# Patient Record
Sex: FEMALE | Race: WHITE | NOT HISPANIC OR LATINO | URBAN - METROPOLITAN AREA
[De-identification: names, ages, dates, MRNs, and addresses within clinical notes are randomized per-mention and may not be internally consistent; named-entity substitution may affect disease eponyms.]

---

## 2024-10-21 ENCOUNTER — APPOINTMENT (OUTPATIENT)
Dept: RADIOLOGY | Facility: HOSPITAL | Age: 49
End: 2024-10-21
Payer: COMMERCIAL

## 2024-10-21 ENCOUNTER — HOSPITAL ENCOUNTER (OUTPATIENT)
Facility: HOSPITAL | Age: 49
Setting detail: OBSERVATION
Discharge: HOME | End: 2024-10-23
Attending: STUDENT IN AN ORGANIZED HEALTH CARE EDUCATION/TRAINING PROGRAM | Admitting: SURGERY
Payer: COMMERCIAL

## 2024-10-21 DIAGNOSIS — K76.89 LIVER CYST: ICD-10-CM

## 2024-10-21 DIAGNOSIS — K35.30 ACUTE APPENDICITIS WITH LOCALIZED PERITONITIS, WITHOUT PERFORATION, ABSCESS, OR GANGRENE: Primary | ICD-10-CM

## 2024-10-21 PROBLEM — K35.80 ACUTE APPENDICITIS: Status: ACTIVE | Noted: 2024-10-21

## 2024-10-21 LAB
ALBUMIN SERPL BCP-MCNC: 3.9 G/DL (ref 3.4–5)
ALP SERPL-CCNC: 47 U/L (ref 33–110)
ALT SERPL W P-5'-P-CCNC: 15 U/L (ref 7–45)
ANION GAP SERPL CALC-SCNC: 12 MMOL/L (ref 10–20)
APPEARANCE UR: CLEAR
AST SERPL W P-5'-P-CCNC: 15 U/L (ref 9–39)
B-HCG SERPL-ACNC: <2 MIU/ML
BACTERIA #/AREA URNS AUTO: ABNORMAL /HPF
BASOPHILS # BLD AUTO: 0.02 X10*3/UL (ref 0–0.1)
BASOPHILS NFR BLD AUTO: 0.1 %
BILIRUB SERPL-MCNC: 0.8 MG/DL (ref 0–1.2)
BILIRUB UR STRIP.AUTO-MCNC: NEGATIVE MG/DL
BUN SERPL-MCNC: 9 MG/DL (ref 6–23)
CALCIUM SERPL-MCNC: 8.6 MG/DL (ref 8.6–10.3)
CHLORIDE SERPL-SCNC: 102 MMOL/L (ref 98–107)
CO2 SERPL-SCNC: 26 MMOL/L (ref 21–32)
COLOR UR: YELLOW
CREAT SERPL-MCNC: 0.7 MG/DL (ref 0.5–1.05)
EGFRCR SERPLBLD CKD-EPI 2021: >90 ML/MIN/1.73M*2
EOSINOPHIL # BLD AUTO: 0.05 X10*3/UL (ref 0–0.7)
EOSINOPHIL NFR BLD AUTO: 0.3 %
ERYTHROCYTE [DISTWIDTH] IN BLOOD BY AUTOMATED COUNT: 13.2 % (ref 11.5–14.5)
GLUCOSE SERPL-MCNC: 103 MG/DL (ref 74–99)
GLUCOSE UR STRIP.AUTO-MCNC: NORMAL MG/DL
HCG UR QL IA.RAPID: NEGATIVE
HCT VFR BLD AUTO: 39.9 % (ref 36–46)
HGB BLD-MCNC: 13.1 G/DL (ref 12–16)
IMM GRANULOCYTES # BLD AUTO: 0.07 X10*3/UL (ref 0–0.7)
IMM GRANULOCYTES NFR BLD AUTO: 0.4 % (ref 0–0.9)
KETONES UR STRIP.AUTO-MCNC: ABNORMAL MG/DL
LACTATE SERPL-SCNC: 0.7 MMOL/L (ref 0.4–2)
LEUKOCYTE ESTERASE UR QL STRIP.AUTO: NEGATIVE
LIPASE SERPL-CCNC: 8 U/L (ref 9–82)
LYMPHOCYTES # BLD AUTO: 2.2 X10*3/UL (ref 1.2–4.8)
LYMPHOCYTES NFR BLD AUTO: 12.6 %
MCH RBC QN AUTO: 31.2 PG (ref 26–34)
MCHC RBC AUTO-ENTMCNC: 32.8 G/DL (ref 32–36)
MCV RBC AUTO: 95 FL (ref 80–100)
MONOCYTES # BLD AUTO: 1.08 X10*3/UL (ref 0.1–1)
MONOCYTES NFR BLD AUTO: 6.2 %
MUCOUS THREADS #/AREA URNS AUTO: ABNORMAL /LPF
NEUTROPHILS # BLD AUTO: 14.1 X10*3/UL (ref 1.2–7.7)
NEUTROPHILS NFR BLD AUTO: 80.4 %
NITRITE UR QL STRIP.AUTO: NEGATIVE
NRBC BLD-RTO: 0 /100 WBCS (ref 0–0)
PH UR STRIP.AUTO: 6 [PH]
PLATELET # BLD AUTO: 348 X10*3/UL (ref 150–450)
POTASSIUM SERPL-SCNC: 3.8 MMOL/L (ref 3.5–5.3)
PROT SERPL-MCNC: 6.9 G/DL (ref 6.4–8.2)
PROT UR STRIP.AUTO-MCNC: NEGATIVE MG/DL
RBC # BLD AUTO: 4.2 X10*6/UL (ref 4–5.2)
RBC # UR STRIP.AUTO: ABNORMAL /UL
RBC #/AREA URNS AUTO: ABNORMAL /HPF
SODIUM SERPL-SCNC: 136 MMOL/L (ref 136–145)
SP GR UR STRIP.AUTO: 1.01
SQUAMOUS #/AREA URNS AUTO: ABNORMAL /HPF
UROBILINOGEN UR STRIP.AUTO-MCNC: NORMAL MG/DL
WBC # BLD AUTO: 17.5 X10*3/UL (ref 4.4–11.3)
WBC #/AREA URNS AUTO: ABNORMAL /HPF

## 2024-10-21 PROCEDURE — G0378 HOSPITAL OBSERVATION PER HR: HCPCS

## 2024-10-21 PROCEDURE — 74177 CT ABD & PELVIS W/CONTRAST: CPT | Performed by: RADIOLOGY

## 2024-10-21 PROCEDURE — 81003 URINALYSIS AUTO W/O SCOPE: CPT | Performed by: NURSE PRACTITIONER

## 2024-10-21 PROCEDURE — 99285 EMERGENCY DEPT VISIT HI MDM: CPT | Mod: 25

## 2024-10-21 PROCEDURE — 99221 1ST HOSP IP/OBS SF/LOW 40: CPT

## 2024-10-21 PROCEDURE — 74177 CT ABD & PELVIS W/CONTRAST: CPT

## 2024-10-21 PROCEDURE — 84702 CHORIONIC GONADOTROPIN TEST: CPT | Performed by: STUDENT IN AN ORGANIZED HEALTH CARE EDUCATION/TRAINING PROGRAM

## 2024-10-21 PROCEDURE — 2550000001 HC RX 255 CONTRASTS: Performed by: NURSE PRACTITIONER

## 2024-10-21 PROCEDURE — 85025 COMPLETE CBC W/AUTO DIFF WBC: CPT | Performed by: NURSE PRACTITIONER

## 2024-10-21 PROCEDURE — 83690 ASSAY OF LIPASE: CPT | Performed by: NURSE PRACTITIONER

## 2024-10-21 PROCEDURE — 2500000004 HC RX 250 GENERAL PHARMACY W/ HCPCS (ALT 636 FOR OP/ED): Performed by: STUDENT IN AN ORGANIZED HEALTH CARE EDUCATION/TRAINING PROGRAM

## 2024-10-21 PROCEDURE — 80053 COMPREHEN METABOLIC PANEL: CPT | Performed by: NURSE PRACTITIONER

## 2024-10-21 PROCEDURE — 36415 COLL VENOUS BLD VENIPUNCTURE: CPT | Performed by: NURSE PRACTITIONER

## 2024-10-21 PROCEDURE — 83605 ASSAY OF LACTIC ACID: CPT | Performed by: NURSE PRACTITIONER

## 2024-10-21 PROCEDURE — 2500000001 HC RX 250 WO HCPCS SELF ADMINISTERED DRUGS (ALT 637 FOR MEDICARE OP)

## 2024-10-21 PROCEDURE — 96365 THER/PROPH/DIAG IV INF INIT: CPT | Mod: 59

## 2024-10-21 PROCEDURE — 81025 URINE PREGNANCY TEST: CPT | Performed by: NURSE PRACTITIONER

## 2024-10-21 RX ORDER — NALOXONE HYDROCHLORIDE 0.4 MG/ML
0.2 INJECTION, SOLUTION INTRAMUSCULAR; INTRAVENOUS; SUBCUTANEOUS EVERY 5 MIN PRN
Status: DISCONTINUED | OUTPATIENT
Start: 2024-10-21 | End: 2024-10-21

## 2024-10-21 RX ORDER — MORPHINE SULFATE 2 MG/ML
2 INJECTION, SOLUTION INTRAMUSCULAR; INTRAVENOUS EVERY 4 HOURS PRN
Status: DISCONTINUED | OUTPATIENT
Start: 2024-10-21 | End: 2024-10-21

## 2024-10-21 RX ORDER — ACETAMINOPHEN 325 MG/1
650 TABLET ORAL EVERY 4 HOURS PRN
Status: DISCONTINUED | OUTPATIENT
Start: 2024-10-21 | End: 2024-10-23 | Stop reason: HOSPADM

## 2024-10-21 RX ORDER — MORPHINE SULFATE 4 MG/ML
4 INJECTION, SOLUTION INTRAMUSCULAR; INTRAVENOUS ONCE
Status: DISCONTINUED | OUTPATIENT
Start: 2024-10-21 | End: 2024-10-23 | Stop reason: HOSPADM

## 2024-10-21 RX ORDER — ONDANSETRON 4 MG/1
4 TABLET, ORALLY DISINTEGRATING ORAL EVERY 8 HOURS PRN
Status: DISCONTINUED | OUTPATIENT
Start: 2024-10-21 | End: 2024-10-23 | Stop reason: HOSPADM

## 2024-10-21 RX ORDER — ONDANSETRON HYDROCHLORIDE 2 MG/ML
4 INJECTION, SOLUTION INTRAVENOUS EVERY 8 HOURS PRN
Status: DISCONTINUED | OUTPATIENT
Start: 2024-10-21 | End: 2024-10-23 | Stop reason: HOSPADM

## 2024-10-21 RX ORDER — MORPHINE SULFATE 4 MG/ML
4 INJECTION, SOLUTION INTRAMUSCULAR; INTRAVENOUS EVERY 4 HOURS PRN
Status: DISCONTINUED | OUTPATIENT
Start: 2024-10-21 | End: 2024-10-22

## 2024-10-21 SDOH — SOCIAL STABILITY: SOCIAL INSECURITY: WERE YOU ABLE TO COMPLETE ALL THE BEHAVIORAL HEALTH SCREENINGS?: YES

## 2024-10-21 SDOH — SOCIAL STABILITY: SOCIAL INSECURITY: WITHIN THE LAST YEAR, HAVE YOU BEEN HUMILIATED OR EMOTIONALLY ABUSED IN OTHER WAYS BY YOUR PARTNER OR EX-PARTNER?: NO

## 2024-10-21 SDOH — SOCIAL STABILITY: SOCIAL INSECURITY: DO YOU FEEL UNSAFE GOING BACK TO THE PLACE WHERE YOU ARE LIVING?: NO

## 2024-10-21 SDOH — SOCIAL STABILITY: SOCIAL INSECURITY: ARE THERE ANY APPARENT SIGNS OF INJURIES/BEHAVIORS THAT COULD BE RELATED TO ABUSE/NEGLECT?: NO

## 2024-10-21 SDOH — ECONOMIC STABILITY: HOUSING INSECURITY: AT ANY TIME IN THE PAST 12 MONTHS, WERE YOU HOMELESS OR LIVING IN A SHELTER (INCLUDING NOW)?: PATIENT DECLINED

## 2024-10-21 SDOH — SOCIAL STABILITY: SOCIAL INSECURITY: WITHIN THE LAST YEAR, HAVE YOU BEEN AFRAID OF YOUR PARTNER OR EX-PARTNER?: NO

## 2024-10-21 SDOH — SOCIAL STABILITY: SOCIAL INSECURITY
WITHIN THE LAST YEAR, HAVE YOU BEEN KICKED, HIT, SLAPPED, OR OTHERWISE PHYSICALLY HURT BY YOUR PARTNER OR EX-PARTNER?: NO

## 2024-10-21 SDOH — SOCIAL STABILITY: SOCIAL INSECURITY: HAS ANYONE EVER THREATENED TO HURT YOUR FAMILY OR YOUR PETS?: NO

## 2024-10-21 SDOH — ECONOMIC STABILITY: FOOD INSECURITY: WITHIN THE PAST 12 MONTHS, THE FOOD YOU BOUGHT JUST DIDN'T LAST AND YOU DIDN'T HAVE MONEY TO GET MORE.: PATIENT DECLINED

## 2024-10-21 SDOH — SOCIAL STABILITY: SOCIAL INSECURITY
WITHIN THE LAST YEAR, HAVE YOU BEEN RAPED OR FORCED TO HAVE ANY KIND OF SEXUAL ACTIVITY BY YOUR PARTNER OR EX-PARTNER?: NO

## 2024-10-21 SDOH — SOCIAL STABILITY: SOCIAL INSECURITY: DOES ANYONE TRY TO KEEP YOU FROM HAVING/CONTACTING OTHER FRIENDS OR DOING THINGS OUTSIDE YOUR HOME?: NO

## 2024-10-21 SDOH — ECONOMIC STABILITY: INCOME INSECURITY
IN THE PAST 12 MONTHS HAS THE ELECTRIC, GAS, OIL, OR WATER COMPANY THREATENED TO SHUT OFF SERVICES IN YOUR HOME?: PATIENT DECLINED

## 2024-10-21 SDOH — SOCIAL STABILITY: SOCIAL INSECURITY: DO YOU FEEL ANYONE HAS EXPLOITED OR TAKEN ADVANTAGE OF YOU FINANCIALLY OR OF YOUR PERSONAL PROPERTY?: NO

## 2024-10-21 SDOH — SOCIAL STABILITY: SOCIAL INSECURITY: HAVE YOU HAD ANY THOUGHTS OF HARMING ANYONE ELSE?: NO

## 2024-10-21 SDOH — ECONOMIC STABILITY: HOUSING INSECURITY: IN THE LAST 12 MONTHS, WAS THERE A TIME WHEN YOU WERE NOT ABLE TO PAY THE MORTGAGE OR RENT ON TIME?: PATIENT DECLINED

## 2024-10-21 SDOH — ECONOMIC STABILITY: FOOD INSECURITY: HOW HARD IS IT FOR YOU TO PAY FOR THE VERY BASICS LIKE FOOD, HOUSING, MEDICAL CARE, AND HEATING?: PATIENT DECLINED

## 2024-10-21 SDOH — SOCIAL STABILITY: SOCIAL INSECURITY: HAVE YOU HAD THOUGHTS OF HARMING ANYONE ELSE?: NO

## 2024-10-21 SDOH — ECONOMIC STABILITY: HOUSING INSECURITY: IN THE PAST 12 MONTHS, HOW MANY TIMES HAVE YOU MOVED WHERE YOU WERE LIVING?: 1

## 2024-10-21 SDOH — SOCIAL STABILITY: SOCIAL INSECURITY: ABUSE: ADULT

## 2024-10-21 SDOH — SOCIAL STABILITY: SOCIAL INSECURITY: ARE YOU OR HAVE YOU BEEN THREATENED OR ABUSED PHYSICALLY, EMOTIONALLY, OR SEXUALLY BY ANYONE?: NO

## 2024-10-21 SDOH — ECONOMIC STABILITY: TRANSPORTATION INSECURITY
IN THE PAST 12 MONTHS, HAS LACK OF TRANSPORTATION KEPT YOU FROM MEDICAL APPOINTMENTS OR FROM GETTING MEDICATIONS?: PATIENT DECLINED

## 2024-10-21 SDOH — ECONOMIC STABILITY: FOOD INSECURITY
WITHIN THE PAST 12 MONTHS, YOU WORRIED THAT YOUR FOOD WOULD RUN OUT BEFORE YOU GOT THE MONEY TO BUY MORE.: PATIENT DECLINED

## 2024-10-21 ASSESSMENT — ACTIVITIES OF DAILY LIVING (ADL)
GROOMING: INDEPENDENT
TOILETING: INDEPENDENT
LACK_OF_TRANSPORTATION: PATIENT DECLINED
WALKS IN HOME: INDEPENDENT
JUDGMENT_ADEQUATE_SAFELY_COMPLETE_DAILY_ACTIVITIES: YES
PATIENT'S MEMORY ADEQUATE TO SAFELY COMPLETE DAILY ACTIVITIES?: YES
HEARING - LEFT EAR: FUNCTIONAL
BATHING: INDEPENDENT
LACK_OF_TRANSPORTATION: PATIENT DECLINED
FEEDING YOURSELF: INDEPENDENT
HEARING - RIGHT EAR: FUNCTIONAL
ADEQUATE_TO_COMPLETE_ADL: YES
DRESSING YOURSELF: INDEPENDENT

## 2024-10-21 ASSESSMENT — PAIN DESCRIPTION - DESCRIPTORS
DESCRIPTORS: ACHING
DESCRIPTORS: ACHING;PRESSURE

## 2024-10-21 ASSESSMENT — PAIN DESCRIPTION - PAIN TYPE: TYPE: ACUTE PAIN

## 2024-10-21 ASSESSMENT — COLUMBIA-SUICIDE SEVERITY RATING SCALE - C-SSRS
2. HAVE YOU ACTUALLY HAD ANY THOUGHTS OF KILLING YOURSELF?: NO
6. HAVE YOU EVER DONE ANYTHING, STARTED TO DO ANYTHING, OR PREPARED TO DO ANYTHING TO END YOUR LIFE?: NO
1. IN THE PAST MONTH, HAVE YOU WISHED YOU WERE DEAD OR WISHED YOU COULD GO TO SLEEP AND NOT WAKE UP?: NO

## 2024-10-21 ASSESSMENT — PAIN SCALES - GENERAL
PAINLEVEL_OUTOF10: 6
PAINLEVEL_OUTOF10: 6

## 2024-10-21 ASSESSMENT — COGNITIVE AND FUNCTIONAL STATUS - GENERAL
PATIENT BASELINE BEDBOUND: NO
MOBILITY SCORE: 24
DAILY ACTIVITIY SCORE: 24

## 2024-10-21 ASSESSMENT — ENCOUNTER SYMPTOMS
VOMITING: 0
ABDOMINAL PAIN: 1
CONSTIPATION: 0
NAUSEA: 1
CHILLS: 1
DIARRHEA: 1

## 2024-10-21 ASSESSMENT — PAIN - FUNCTIONAL ASSESSMENT
PAIN_FUNCTIONAL_ASSESSMENT: 0-10
PAIN_FUNCTIONAL_ASSESSMENT: 0-10

## 2024-10-21 ASSESSMENT — PATIENT HEALTH QUESTIONNAIRE - PHQ9
1. LITTLE INTEREST OR PLEASURE IN DOING THINGS: NOT AT ALL
SUM OF ALL RESPONSES TO PHQ9 QUESTIONS 1 & 2: 0
2. FEELING DOWN, DEPRESSED OR HOPELESS: NOT AT ALL

## 2024-10-21 ASSESSMENT — LIFESTYLE VARIABLES
AUDIT-C TOTAL SCORE: -1
HOW OFTEN DO YOU HAVE 6 OR MORE DRINKS ON ONE OCCASION: PATIENT DECLINED
AUDIT-C TOTAL SCORE: -1
HOW OFTEN DO YOU HAVE A DRINK CONTAINING ALCOHOL: PATIENT DECLINED
SKIP TO QUESTIONS 9-10: 0
HOW MANY STANDARD DRINKS CONTAINING ALCOHOL DO YOU HAVE ON A TYPICAL DAY: PATIENT DECLINED

## 2024-10-21 ASSESSMENT — PAIN DESCRIPTION - LOCATION
LOCATION: ABDOMEN
LOCATION: ABDOMEN

## 2024-10-21 ASSESSMENT — PAIN DESCRIPTION - ORIENTATION: ORIENTATION: RIGHT

## 2024-10-21 NOTE — ED TRIAGE NOTES
TRIAGE NOTE   I saw the patient as the Clinician in Triage and performed a brief history and physical exam, established acuity, and ordered appropriate tests to develop basic plan of care. Patient will be seen by an CLAUDE, resident and/or physician who will independently evaluate the patient. Please see subsequent provider notes for further details and disposition.     Brief HPI: In brief, Fifi Larsen is a 49 y.o. female that presents for right lower quadrant abdominal pain with nausea x 2 days..     Focused Physical exam:   Alert and oriented  Right lower quadrant pain on exam  Positive McBurney sign  RRR    Plan/MDM:   CBC, CMP, lipase, lactic, urinalysis, urine pregnancy, CT abdomen pelvis with contrast    Please see subsequent provider note for further details and disposition

## 2024-10-21 NOTE — ED PROVIDER NOTES
HPI   Chief Complaint   Patient presents with    Abdominal Pain       49-year-old female presents with right lower quadrant abdominal pain for the past day.  She reports the pain was initially generalized and has since moved into the right lower quadrant.  She has no significant medical history and does not take any medications regularly.  She denies any known history of ovarian cysts, recurrent UTI, nephrolithiasis, history of prior abdominal surgeries.  She denies chest pain, shortness of breath, fevers, chills, vomiting, diarrhea, hematochezia, melena, dysuria, flank pain.              Patient History   Past Medical History:   Diagnosis Date    Acute appendicitis     RLQ abdominal pain      Past Surgical History:   Procedure Laterality Date    BREAST LUMPECTOMY Left      No family history on file.  Social History     Tobacco Use    Smoking status: Never    Smokeless tobacco: Never   Substance Use Topics    Alcohol use: Yes     Comment: occasional    Drug use: Never       Physical Exam   ED Triage Vitals [10/21/24 1603]   Temperature Heart Rate Respirations BP   37.6 °C (99.7 °F) 74 20 110/79      Pulse Ox Temp Source Heart Rate Source Patient Position   99 % Temporal Monitor --      BP Location FiO2 (%)     -- --       Physical Exam  Vitals reviewed.   Cardiovascular:      Rate and Rhythm: Normal rate and regular rhythm.   Pulmonary:      Effort: Pulmonary effort is normal.      Breath sounds: Normal breath sounds.   Abdominal:      Palpations: Abdomen is soft.      Tenderness: There is abdominal tenderness in the right lower quadrant and suprapubic area. There is guarding. There is no right CVA tenderness, left CVA tenderness or rebound.   Neurological:      General: No focal deficit present.      Mental Status: She is alert and oriented to person, place, and time.           ED Course & MDM   Diagnoses as of 10/24/24 0121   Acute appendicitis with localized peritonitis, without perforation, abscess, or gangrene    Liver cyst                 No data recorded     Diller Coma Scale Score: 15 (10/23/24 0908 : David Alberts RN)                           Medical Decision Making  49-year-old female without significant medical history with no prior surgical history presenting with right lower quadrant pain since last night.  Reports worsening pain since that time.  Patient nontoxic on exam, resting comfortably.  Vital signs stable.  Patient does have tenderness palpation in the right lower quadrant with voluntary guarding, no rebound or rigidity.  No CVA tenderness.  Patient remains n.p.o. from arrival to the emergency department.  Clinical considerations include appendicitis versus perforated appendix versus ovarian torsion versus cystitis versus ovarian cyst CT abdomen pelvis showing acute appendicitis without a perforation.  General surgery consulted.  Signed out to oncoming attending provider for further disposition and admission.        Procedure  Procedures     Tracey Castaneda MD  10/21/24 214       Tracey Castaneda MD  10/24/24 0125

## 2024-10-22 ENCOUNTER — ANESTHESIA (OUTPATIENT)
Dept: OPERATING ROOM | Facility: HOSPITAL | Age: 49
End: 2024-10-22
Payer: COMMERCIAL

## 2024-10-22 ENCOUNTER — ANESTHESIA EVENT (OUTPATIENT)
Dept: OPERATING ROOM | Facility: HOSPITAL | Age: 49
End: 2024-10-22
Payer: COMMERCIAL

## 2024-10-22 LAB
ANION GAP SERPL CALC-SCNC: 14 MMOL/L (ref 10–20)
BUN SERPL-MCNC: 11 MG/DL (ref 6–23)
CALCIUM SERPL-MCNC: 8.3 MG/DL (ref 8.6–10.3)
CHLORIDE SERPL-SCNC: 102 MMOL/L (ref 98–107)
CO2 SERPL-SCNC: 25 MMOL/L (ref 21–32)
CREAT SERPL-MCNC: 0.84 MG/DL (ref 0.5–1.05)
EGFRCR SERPLBLD CKD-EPI 2021: 85 ML/MIN/1.73M*2
ERYTHROCYTE [DISTWIDTH] IN BLOOD BY AUTOMATED COUNT: 13.2 % (ref 11.5–14.5)
GLUCOSE SERPL-MCNC: 91 MG/DL (ref 74–99)
HCT VFR BLD AUTO: 37.1 % (ref 36–46)
HGB BLD-MCNC: 12.3 G/DL (ref 12–16)
MCH RBC QN AUTO: 31.4 PG (ref 26–34)
MCHC RBC AUTO-ENTMCNC: 33.2 G/DL (ref 32–36)
MCV RBC AUTO: 95 FL (ref 80–100)
NRBC BLD-RTO: 0 /100 WBCS (ref 0–0)
PLATELET # BLD AUTO: 291 X10*3/UL (ref 150–450)
POTASSIUM SERPL-SCNC: 3.6 MMOL/L (ref 3.5–5.3)
RBC # BLD AUTO: 3.92 X10*6/UL (ref 4–5.2)
SODIUM SERPL-SCNC: 137 MMOL/L (ref 136–145)
WBC # BLD AUTO: 14.6 X10*3/UL (ref 4.4–11.3)

## 2024-10-22 PROCEDURE — 36415 COLL VENOUS BLD VENIPUNCTURE: CPT

## 2024-10-22 PROCEDURE — 9420000001 HC RT PATIENT EDUCATION 5 MIN

## 2024-10-22 PROCEDURE — 96366 THER/PROPH/DIAG IV INF ADDON: CPT | Mod: 59

## 2024-10-22 PROCEDURE — 2500000001 HC RX 250 WO HCPCS SELF ADMINISTERED DRUGS (ALT 637 FOR MEDICARE OP): Performed by: SURGERY

## 2024-10-22 PROCEDURE — 96372 THER/PROPH/DIAG INJ SC/IM: CPT | Mod: 59 | Performed by: SURGERY

## 2024-10-22 PROCEDURE — 3700000001 HC GENERAL ANESTHESIA TIME - INITIAL BASE CHARGE: Performed by: SURGERY

## 2024-10-22 PROCEDURE — G0378 HOSPITAL OBSERVATION PER HR: HCPCS

## 2024-10-22 PROCEDURE — 7100000001 HC RECOVERY ROOM TIME - INITIAL BASE CHARGE: Performed by: SURGERY

## 2024-10-22 PROCEDURE — 2720000007 HC OR 272 NO HCPCS: Performed by: SURGERY

## 2024-10-22 PROCEDURE — 3700000002 HC GENERAL ANESTHESIA TIME - EACH INCREMENTAL 1 MINUTE: Performed by: SURGERY

## 2024-10-22 PROCEDURE — 85027 COMPLETE CBC AUTOMATED: CPT

## 2024-10-22 PROCEDURE — A44970 PR LAP,APPENDECTOMY: Performed by: ANESTHESIOLOGY

## 2024-10-22 PROCEDURE — 2500000005 HC RX 250 GENERAL PHARMACY W/O HCPCS: Performed by: SURGERY

## 2024-10-22 PROCEDURE — 96375 TX/PRO/DX INJ NEW DRUG ADDON: CPT | Mod: 59

## 2024-10-22 PROCEDURE — 44970 LAPAROSCOPY APPENDECTOMY: CPT | Performed by: SURGERY

## 2024-10-22 PROCEDURE — 2500000004 HC RX 250 GENERAL PHARMACY W/ HCPCS (ALT 636 FOR OP/ED): Performed by: SURGERY

## 2024-10-22 PROCEDURE — A44970 PR LAP,APPENDECTOMY: Performed by: NURSE ANESTHETIST, CERTIFIED REGISTERED

## 2024-10-22 PROCEDURE — 3600000004 HC OR TIME - INITIAL BASE CHARGE - PROCEDURE LEVEL FOUR: Performed by: SURGERY

## 2024-10-22 PROCEDURE — 0752T DGTZ GLS MCRSCP SLD LVL III: CPT | Mod: TC,AHULAB

## 2024-10-22 PROCEDURE — 3600000009 HC OR TIME - EACH INCREMENTAL 1 MINUTE - PROCEDURE LEVEL FOUR: Performed by: SURGERY

## 2024-10-22 PROCEDURE — 99140 ANES COMP EMERGENCY COND: CPT | Performed by: ANESTHESIOLOGY

## 2024-10-22 PROCEDURE — 2500000004 HC RX 250 GENERAL PHARMACY W/ HCPCS (ALT 636 FOR OP/ED): Performed by: NURSE ANESTHETIST, CERTIFIED REGISTERED

## 2024-10-22 PROCEDURE — 80048 BASIC METABOLIC PNL TOTAL CA: CPT

## 2024-10-22 PROCEDURE — 7100000002 HC RECOVERY ROOM TIME - EACH INCREMENTAL 1 MINUTE: Performed by: SURGERY

## 2024-10-22 PROCEDURE — 2500000004 HC RX 250 GENERAL PHARMACY W/ HCPCS (ALT 636 FOR OP/ED)

## 2024-10-22 RX ORDER — OXYCODONE HYDROCHLORIDE 5 MG/1
5 TABLET ORAL EVERY 4 HOURS PRN
Status: DISCONTINUED | OUTPATIENT
Start: 2024-10-22 | End: 2024-10-22 | Stop reason: HOSPADM

## 2024-10-22 RX ORDER — FENTANYL CITRATE 50 UG/ML
INJECTION, SOLUTION INTRAMUSCULAR; INTRAVENOUS AS NEEDED
Status: DISCONTINUED | OUTPATIENT
Start: 2024-10-22 | End: 2024-10-22

## 2024-10-22 RX ORDER — LIDOCAINE HYDROCHLORIDE 10 MG/ML
0.1 INJECTION, SOLUTION EPIDURAL; INFILTRATION; INTRACAUDAL; PERINEURAL ONCE
Status: DISCONTINUED | OUTPATIENT
Start: 2024-10-22 | End: 2024-10-22 | Stop reason: HOSPADM

## 2024-10-22 RX ORDER — BUPIVACAINE HYDROCHLORIDE 5 MG/ML
INJECTION, SOLUTION PERINEURAL AS NEEDED
Status: DISCONTINUED | OUTPATIENT
Start: 2024-10-22 | End: 2024-10-22 | Stop reason: HOSPADM

## 2024-10-22 RX ORDER — ROCURONIUM BROMIDE 10 MG/ML
INJECTION, SOLUTION INTRAVENOUS AS NEEDED
Status: DISCONTINUED | OUTPATIENT
Start: 2024-10-22 | End: 2024-10-22

## 2024-10-22 RX ORDER — SODIUM CHLORIDE, SODIUM LACTATE, POTASSIUM CHLORIDE, CALCIUM CHLORIDE 600; 310; 30; 20 MG/100ML; MG/100ML; MG/100ML; MG/100ML
100 INJECTION, SOLUTION INTRAVENOUS CONTINUOUS
Status: DISCONTINUED | OUTPATIENT
Start: 2024-10-22 | End: 2024-10-22 | Stop reason: HOSPADM

## 2024-10-22 RX ORDER — HEPARIN SODIUM 5000 [USP'U]/ML
5000 INJECTION, SOLUTION INTRAVENOUS; SUBCUTANEOUS EVERY 8 HOURS
Status: DISCONTINUED | OUTPATIENT
Start: 2024-10-22 | End: 2024-10-23 | Stop reason: HOSPADM

## 2024-10-22 RX ORDER — POLYETHYLENE GLYCOL 3350 17 G/17G
17 POWDER, FOR SOLUTION ORAL DAILY
Status: DISCONTINUED | OUTPATIENT
Start: 2024-10-22 | End: 2024-10-23 | Stop reason: HOSPADM

## 2024-10-22 RX ORDER — OXYCODONE AND ACETAMINOPHEN 5; 325 MG/1; MG/1
1 TABLET ORAL EVERY 4 HOURS PRN
Status: DISCONTINUED | OUTPATIENT
Start: 2024-10-22 | End: 2024-10-23 | Stop reason: HOSPADM

## 2024-10-22 RX ORDER — KETOROLAC TROMETHAMINE 30 MG/ML
15 INJECTION, SOLUTION INTRAMUSCULAR; INTRAVENOUS EVERY 6 HOURS
Status: DISCONTINUED | OUTPATIENT
Start: 2024-10-22 | End: 2024-10-23 | Stop reason: HOSPADM

## 2024-10-22 RX ORDER — LIDOCAINE HYDROCHLORIDE 20 MG/ML
INJECTION, SOLUTION EPIDURAL; INFILTRATION; INTRACAUDAL; PERINEURAL AS NEEDED
Status: DISCONTINUED | OUTPATIENT
Start: 2024-10-22 | End: 2024-10-22

## 2024-10-22 RX ORDER — MIDAZOLAM HYDROCHLORIDE 1 MG/ML
INJECTION INTRAMUSCULAR; INTRAVENOUS AS NEEDED
Status: DISCONTINUED | OUTPATIENT
Start: 2024-10-22 | End: 2024-10-22

## 2024-10-22 RX ORDER — ONDANSETRON HYDROCHLORIDE 2 MG/ML
INJECTION, SOLUTION INTRAVENOUS AS NEEDED
Status: DISCONTINUED | OUTPATIENT
Start: 2024-10-22 | End: 2024-10-22

## 2024-10-22 RX ORDER — ACETAMINOPHEN 325 MG/1
650 TABLET ORAL EVERY 4 HOURS PRN
Status: DISCONTINUED | OUTPATIENT
Start: 2024-10-22 | End: 2024-10-22 | Stop reason: HOSPADM

## 2024-10-22 RX ORDER — SODIUM CHLORIDE, SODIUM LACTATE, POTASSIUM CHLORIDE, CALCIUM CHLORIDE 600; 310; 30; 20 MG/100ML; MG/100ML; MG/100ML; MG/100ML
INJECTION, SOLUTION INTRAVENOUS CONTINUOUS PRN
Status: DISCONTINUED | OUTPATIENT
Start: 2024-10-22 | End: 2024-10-22

## 2024-10-22 RX ORDER — SODIUM CHLORIDE 0.9 G/100ML
IRRIGANT IRRIGATION AS NEEDED
Status: DISCONTINUED | OUTPATIENT
Start: 2024-10-22 | End: 2024-10-22 | Stop reason: HOSPADM

## 2024-10-22 RX ORDER — PROPOFOL 10 MG/ML
INJECTION, EMULSION INTRAVENOUS AS NEEDED
Status: DISCONTINUED | OUTPATIENT
Start: 2024-10-22 | End: 2024-10-22

## 2024-10-22 RX ORDER — KETOROLAC TROMETHAMINE 30 MG/ML
INJECTION, SOLUTION INTRAMUSCULAR; INTRAVENOUS AS NEEDED
Status: DISCONTINUED | OUTPATIENT
Start: 2024-10-22 | End: 2024-10-22

## 2024-10-22 SDOH — HEALTH STABILITY: MENTAL HEALTH: CURRENT SMOKER: 0

## 2024-10-22 ASSESSMENT — COGNITIVE AND FUNCTIONAL STATUS - GENERAL
DAILY ACTIVITIY SCORE: 24
MOBILITY SCORE: 24
MOBILITY SCORE: 24
DAILY ACTIVITIY SCORE: 24

## 2024-10-22 ASSESSMENT — PAIN SCALES - GENERAL
PAINLEVEL_OUTOF10: 3
PAINLEVEL_OUTOF10: 0 - NO PAIN
PAINLEVEL_OUTOF10: 4
PAINLEVEL_OUTOF10: 2
PAINLEVEL_OUTOF10: 2
PAINLEVEL_OUTOF10: 0 - NO PAIN
PAINLEVEL_OUTOF10: 2
PAINLEVEL_OUTOF10: 0 - NO PAIN
PAINLEVEL_OUTOF10: 4
PAINLEVEL_OUTOF10: 7

## 2024-10-22 ASSESSMENT — PAIN DESCRIPTION - DESCRIPTORS
DESCRIPTORS: SORE
DESCRIPTORS: SORE
DESCRIPTORS: ACHING;PRESSURE
DESCRIPTORS: SORE

## 2024-10-22 ASSESSMENT — PAIN SCALES - WONG BAKER
WONGBAKER_NUMERICALRESPONSE: HURTS EVEN MORE
WONGBAKER_NUMERICALRESPONSE: HURTS LITTLE BIT

## 2024-10-22 ASSESSMENT — PAIN - FUNCTIONAL ASSESSMENT
PAIN_FUNCTIONAL_ASSESSMENT: 0-10

## 2024-10-22 ASSESSMENT — ACTIVITIES OF DAILY LIVING (ADL): LACK_OF_TRANSPORTATION: NO

## 2024-10-22 ASSESSMENT — PAIN DESCRIPTION - ORIENTATION
ORIENTATION: RIGHT
ORIENTATION: LEFT;LOWER

## 2024-10-22 ASSESSMENT — PAIN DESCRIPTION - LOCATION: LOCATION: ABDOMEN

## 2024-10-22 NOTE — ANESTHESIA PROCEDURE NOTES
Airway  Date/Time: 10/22/2024 1:27 PM  Urgency: elective    Airway not difficult    Staffing  Performed: CRNA   Authorized by: Abdias Stewart MD    Performed by: PONCE Bass-SHIVANI  Patient location during procedure: OR    Indications and Patient Condition  Indications for airway management: anesthesia and airway protection  Spontaneous Ventilation: absent  Sedation level: deep  Preoxygenated: yes  Patient position: sniffing  Mask difficulty assessment: 1 - vent by mask    Final Airway Details  Final airway type: endotracheal airway      Successful airway: ETT  Cuffed: yes   Successful intubation technique: direct laryngoscopy  Endotracheal tube insertion site: oral  Blade: Diann  Blade size: #3  ETT size (mm): 7.0  Cormack-Lehane Classification: grade I - full view of glottis  Placement verified by: chest auscultation, capnometry and palpation of cuff   Cuff volume (mL): 7  Measured from: lips  ETT to lips (cm): 19  Number of attempts at approach: 1  Number of other approaches attempted: 0

## 2024-10-22 NOTE — PROGRESS NOTES
10/22/24 1121   Discharge Planning   Living Arrangements Spouse/significant other;Children   Support Systems Spouse/significant other;Friends/neighbors   Assistance Needed none   Type of Residence Private residence   Home or Post Acute Services None   Expected Discharge Disposition Home   Financial Resource Strain   How hard is it for you to pay for the very basics like food, housing, medical care, and heating? Pt Declined   Housing Stability   In the last 12 months, was there a time when you were not able to pay the mortgage or rent on time? N   At any time in the past 12 months, were you homeless or living in a shelter (including now)? N   Transportation Needs   In the past 12 months, has lack of transportation kept you from medical appointments or from getting medications? no   In the past 12 months, has lack of transportation kept you from meetings, work, or from getting things needed for daily living? No     Plan per Medical/Surgical team: surgery consult, lap addison  Status: observation  Payor source: [employer]  Discharge disposition: Home  Potential Barriers: pain control, PO tolerance, OR schedule  ADOD: 10/23/24

## 2024-10-22 NOTE — CARE PLAN
The patient's goals for the shift include  pain relief    The clinical goals for the shift include Pt will remain HDS throughout the shift    Over the shift, the patient did not make progress toward the following goals. Barriers to progression include  Recommendations to address these barriers include      Problem: Pain - Adult  Goal: Verbalizes/displays adequate comfort level or baseline comfort level  Outcome: Progressing     Problem: Safety - Adult  Goal: Free from fall injury  Outcome: Progressing     Problem: Discharge Planning  Goal: Discharge to home or other facility with appropriate resources  Outcome: Progressing     Problem: Chronic Conditions and Co-morbidities  Goal: Patient's chronic conditions and co-morbidity symptoms are monitored and maintained or improved  Outcome: Progressing

## 2024-10-22 NOTE — H&P
History Of Present Illness  Fifi Larsen is a 49 y.o. female presenting with RLQ abdominal pain. The pain started on Saturday night. Endorses nausea without vomiting. Has had chills but is unsure if she has had a fever. She did have diarrhea this morning. She denies any prior abdominal surgeries. She does not take any blood thinners. In the ED, she is afebrile and is not tachycardic. WBC 17.5, H/H 13.1/39.9, lactate 0.7. CT A/P demonstrated acute appendicitis without perforation or abscess. Trace pelvic free fluid. Due to this, general surgery was consulted.      Past Medical History  No past medical history on file.    Surgical History  No past surgical history on file.     Social History  She has no history on file for tobacco use, alcohol use, and drug use.    Family History  No family history on file.     Allergies  Patient has no known allergies.    Review of Systems   Constitutional:  Positive for chills.   Gastrointestinal:  Positive for abdominal pain (RLQ), diarrhea and nausea. Negative for constipation and vomiting.        Physical Exam  Constitutional:       Appearance: Normal appearance.   HENT:      Head: Normocephalic and atraumatic.      Mouth/Throat:      Mouth: Mucous membranes are moist.      Pharynx: Oropharynx is clear.   Eyes:      Extraocular Movements: Extraocular movements intact.      Pupils: Pupils are equal, round, and reactive to light.   Cardiovascular:      Rate and Rhythm: Normal rate.   Pulmonary:      Effort: Pulmonary effort is normal.   Abdominal:      Palpations: Abdomen is soft.      Tenderness: There is abdominal tenderness (RLQ).   Musculoskeletal:         General: Normal range of motion.      Cervical back: Normal range of motion and neck supple.   Skin:     General: Skin is warm and dry.   Neurological:      General: No focal deficit present.      Mental Status: She is alert and oriented to person, place, and time.   Psychiatric:         Mood and Affect: Mood normal.     "     Behavior: Behavior normal.          Last Recorded Vitals  Blood pressure 111/79, pulse 71, temperature 37.6 °C (99.7 °F), temperature source Temporal, resp. rate 17, height 1.626 m (5' 4\"), weight 63.5 kg (140 lb), SpO2 99%.    Relevant Results  Results for orders placed or performed during the hospital encounter of 10/21/24 (from the past 24 hours)   CBC and Auto Differential   Result Value Ref Range    WBC 17.5 (H) 4.4 - 11.3 x10*3/uL    nRBC 0.0 0.0 - 0.0 /100 WBCs    RBC 4.20 4.00 - 5.20 x10*6/uL    Hemoglobin 13.1 12.0 - 16.0 g/dL    Hematocrit 39.9 36.0 - 46.0 %    MCV 95 80 - 100 fL    MCH 31.2 26.0 - 34.0 pg    MCHC 32.8 32.0 - 36.0 g/dL    RDW 13.2 11.5 - 14.5 %    Platelets 348 150 - 450 x10*3/uL    Neutrophils % 80.4 40.0 - 80.0 %    Immature Granulocytes %, Automated 0.4 0.0 - 0.9 %    Lymphocytes % 12.6 13.0 - 44.0 %    Monocytes % 6.2 2.0 - 10.0 %    Eosinophils % 0.3 0.0 - 6.0 %    Basophils % 0.1 0.0 - 2.0 %    Neutrophils Absolute 14.10 (H) 1.20 - 7.70 x10*3/uL    Immature Granulocytes Absolute, Automated 0.07 0.00 - 0.70 x10*3/uL    Lymphocytes Absolute 2.20 1.20 - 4.80 x10*3/uL    Monocytes Absolute 1.08 (H) 0.10 - 1.00 x10*3/uL    Eosinophils Absolute 0.05 0.00 - 0.70 x10*3/uL    Basophils Absolute 0.02 0.00 - 0.10 x10*3/uL   Comprehensive metabolic panel   Result Value Ref Range    Glucose 103 (H) 74 - 99 mg/dL    Sodium 136 136 - 145 mmol/L    Potassium 3.8 3.5 - 5.3 mmol/L    Chloride 102 98 - 107 mmol/L    Bicarbonate 26 21 - 32 mmol/L    Anion Gap 12 10 - 20 mmol/L    Urea Nitrogen 9 6 - 23 mg/dL    Creatinine 0.70 0.50 - 1.05 mg/dL    eGFR >90 >60 mL/min/1.73m*2    Calcium 8.6 8.6 - 10.3 mg/dL    Albumin 3.9 3.4 - 5.0 g/dL    Alkaline Phosphatase 47 33 - 110 U/L    Total Protein 6.9 6.4 - 8.2 g/dL    AST 15 9 - 39 U/L    Bilirubin, Total 0.8 0.0 - 1.2 mg/dL    ALT 15 7 - 45 U/L   Lipase   Result Value Ref Range    Lipase 8 (L) 9 - 82 U/L   Lactate   Result Value Ref Range    Lactate " 0.7 0.4 - 2.0 mmol/L   hCG, quantitative, pregnancy   Result Value Ref Range    HCG, Beta-Quantitative <2 <5 mIU/mL   hCG, Urine, Qualitative   Result Value Ref Range    HCG, Urine NEGATIVE NEGATIVE   Urinalysis with Reflex Culture and Microscopic   Result Value Ref Range    Color, Urine Yellow Light-Yellow, Yellow, Dark-Yellow    Appearance, Urine Clear Clear    Specific Gravity, Urine 1.014 1.005 - 1.035    pH, Urine 6.0 5.0, 5.5, 6.0, 6.5, 7.0, 7.5, 8.0    Protein, Urine NEGATIVE NEGATIVE, 10 (TRACE), 20 (TRACE) mg/dL    Glucose, Urine Normal Normal mg/dL    Blood, Urine 0.2 (2+) (A) NEGATIVE    Ketones, Urine TRACE (A) NEGATIVE mg/dL    Bilirubin, Urine NEGATIVE NEGATIVE    Urobilinogen, Urine Normal Normal mg/dL    Nitrite, Urine NEGATIVE NEGATIVE    Leukocyte Esterase, Urine NEGATIVE NEGATIVE   Urinalysis Microscopic   Result Value Ref Range    WBC, Urine 1-5 1-5, NONE /HPF    RBC, Urine 3-5 NONE, 1-2, 3-5 /HPF    Squamous Epithelial Cells, Urine 1-9 (SPARSE) Reference range not established. /HPF    Bacteria, Urine 1+ (A) NONE SEEN /HPF    Mucus, Urine FEW Reference range not established. /LPF      CT abdomen pelvis w IV contrast    Result Date: 10/21/2024  Interpreted By:  Michi Galvan, STUDY: CT ABDOMEN PELVIS W IV CONTRAST;  10/21/2024 9:00 pm   INDICATION: Signs/Symptoms:RLQ pain.   COMPARISON: None   ACCESSION NUMBER(S): [id number]   ORDERING CLINICIAN: KASSY EDWARDS   TECHNIQUE: Axial CT images of the abdomen and pelvis with coronal and sagittal reconstructed images obtained. Intravenous contrast was administered, 75 mL Omnipaque 350.   FINDINGS: LOWER CHEST: Mild subsegmental atelectasis.   LIVER: Scattered fluid density hepatic lesions measuring up to 4 cm, likely reflecting cysts. Additional hypodense lesions which are too small to characterize.   BILE DUCTS: Normal caliber.   GALLBLADDER: No calcified stones. No wall thickening.   PANCREAS: Within normal limits.   SPLEEN: Within normal  limits.   ADRENALS: Within normal limits.   KIDNEYS, URETERS, and BLADDER: No hydronephrosis or renal calculi. Ureters are non-dilated. Mild diffuse bladder wall thickening. Bladder is mostly collapsed.   REPRODUCTIVE: Likely physiologic simple appearing 3 cm right adnexal cyst.   VESSELS: Minimal atherosclerosis. No abdominal aortic aneurysm.   RETROPERITONEUM and LYMPH NODES: No lymphadenopathy.   BOWEL: The stomach is unremarkable. Small bowel is non-dilated. There is borderline appendiceal dilation and hyperenhancement of the appendiceal walls with surrounding fat stranding compatible with acute appendicitis. Large bowel is normal.   PERITONEUM: Trace pelvic free fluid. No free air. No fluid collection.   BODY WALL: Small fat containing periumbilical hernia.   MUSCULOSKELETAL: No acute osseous abnormality or suspicious osseous lesions.       1. Acute appendicitis. No perforation or abscess. Trace pelvic free fluid. Surgical evaluation is recommended. 2. Mild diffuse bladder wall thickening which may be secondary to underdistention or mild cystitis. Please correlate with urinalysis. 3. Scattered fluid density hepatic lesions measuring up to 4 cm, likely reflecting cysts. Additional hypodense lesions which are too small to characterize. 4. Likely physiologic simple appearing 3 cm right adnexal cyst.   MACRO: Michi Galvan discussed the significance and urgency of this critical finding by telephone with  KASSY EDWARDS on 10/21/2024 at 9:40 pm.  (**-RCF-**) Findings:  See findings.   Signed by: Michi Galvan 10/21/2024 9:41 PM Dictation workstation:   [id number]         Assessment/Plan   Assessment & Plan  Acute appendicitis with localized peritonitis, without perforation, abscess, or gangrene      Neuro: A&O x3  - Pain control with Tylenol and Morphine  - OOB/ ambulate 5x per day    CV: RRR  - VS every 4 hours    Pulm: Unlabored breathing on room air  - IS every hour while awake  - Pulse ox every 4  hours with VS    GI: abdomen soft, non distended and moderately tender to palpation of the RLQ. Not peritonitic.   - Pain control (see above)  - PRN antiemetic  - NPO at midnight  - OR tomorrow for laparoscopic appendectomy  - Follow up with PCP regarding hepatic cysts    : Voiding independently.  - Monitor I&Os  - Cr: 0.70  - AM BMP    HEME  - H/H: 13.1/39.9  - DVT Proph: SCDs/ ambulate. Holding Lovenox due to upcoming procedure  - AM CBC    Endo: No active issues at this time.    ID  - Afebrile  - WBC: 17.5  - AM CBC  - Zosyn    Dispo: OR tomorrow for laparoscopic appendectomy. Discussed with Dr. Mendiola.     I spent 45 minutes in the professional and overall care of this patient.      Connie Hennessy, APRN-CNP

## 2024-10-22 NOTE — CARE PLAN
The patient's goals for the shift include relief of pain/discomfort     The clinical goals for the shift include pt will maintain hemodynamic stability throughout this shift    Over the shift, the patient did not make progress toward the following goals.      Problem: Pain - Adult  Goal: Verbalizes/displays adequate comfort level or baseline comfort level  Outcome: Progressing     Problem: Safety - Adult  Goal: Free from fall injury  Outcome: Progressing     Problem: Discharge Planning  Goal: Discharge to home or other facility with appropriate resources  Outcome: Progressing     Problem: Chronic Conditions and Co-morbidities  Goal: Patient's chronic conditions and co-morbidity symptoms are monitored and maintained or improved  Outcome: Progressing     Problem: Pain  Goal: Turns in bed with improved pain control throughout the shift  Outcome: Progressing  Goal: Walks with improved pain control throughout the shift  Outcome: Progressing  Goal: Performs ADL's with improved pain control throughout shift  Outcome: Progressing  Goal: Free from opioid side effects throughout the shift  Outcome: Progressing

## 2024-10-22 NOTE — PROGRESS NOTES
Patient is a 49-year-old female who presented to the emergency room with a chief complaint of abdominal discomfort in the right lower quadrant.  She was initially seen and evaluated by Dr. Castaneda and signed out to me pending CT imaging and final disposition.  Please see her initial physician note for details.  CT showed acute appendicitis as well as bladder wall thickening concerning for cystitis and what appears to be liver cysts.  Patient is started on Zosyn and admitted to surgical team for further workup and management.      Leisa Weeks MD

## 2024-10-22 NOTE — ANESTHESIA POSTPROCEDURE EVALUATION
Patient: Fifi Larsen    Procedure Summary       Date: 10/22/24 Room / Location: [facility] A OR 09 / Virtual [facility] A OR    Anesthesia Start: 1315 Anesthesia Stop: 1410    Procedure: Appendectomy Laparoscopy Diagnosis:       Acute appendicitis with localized peritonitis, without perforation, abscess, or gangrene      (Acute appendicitis with localized peritonitis, without perforation, abscess, or gangrene [K35.30])    Surgeons: Jayme Ireland MD Responsible Provider: Abdias Stewart MD    Anesthesia Type: general ASA Status: 2 - Emergent            Anesthesia Type: general    Vitals Value Taken Time   /68 10/22/24 1416   Temp 36.5 °C (97.7 °F) 10/22/24 1407   Pulse 66 10/22/24 1423   Resp 17 10/22/24 1415   SpO2 97 % 10/22/24 1423   Vitals shown include unfiled device data.    Anesthesia Post Evaluation    Patient location during evaluation: PACU  Patient participation: complete - patient participated  Level of consciousness: awake  Pain management: adequate  Airway patency: patent  Cardiovascular status: acceptable  Respiratory status: acceptable  Hydration status: acceptable  Postoperative Nausea and Vomiting: none        No notable events documented.     Problem: PAIN - ADULT  Goal: Verbalizes/displays adequate comfort level or baseline comfort level  Description: Interventions:  - Encourage patient to monitor pain and request assistance  - Assess pain using appropriate pain scale  - Administer analgesics based on type and severity of pain and evaluate response  - Implement non-pharmacological measures as appropriate and evaluate response  - Consider cultural and social influences on pain and pain management  - Notify physician/advanced practitioner if interventions unsuccessful or patient reports new pain  Outcome: Progressing     Problem: INFECTION - ADULT  Goal: Absence or prevention of progression during hospitalization  Description: INTERVENTIONS:  - Assess and monitor for signs and symptoms of infection  - Monitor lab/diagnostic results  - Monitor all insertion sites, i e  indwelling lines, tubes, and drains  - Monitor endotracheal if appropriate and nasal secretions for changes in amount and color  - [place] appropriate cooling/warming therapies per order  - Administer medications as ordered  - Instruct and encourage patient and family to use good hand hygiene technique  - Identify and instruct in appropriate isolation precautions for identified infection/condition  Outcome: Progressing

## 2024-10-22 NOTE — OP NOTE
Appendectomy Laparoscopy Operative Note     Date: 10/22/2024  OR Location: [facility] A OR    Name: Fifi Larsen, : 1975, Age: 49 y.o., MRN: [id number], Sex: female    Diagnosis  Pre-op Diagnosis      * Acute appendicitis with localized peritonitis, without perforation, abscess, or gangrene [K35.30] Post-op Diagnosis     * Acute appendicitis with localized peritonitis, without perforation, abscess, or gangrene [K35.30]     Procedures  Appendectomy Laparoscopy  10[address] - [place] LAPAROSCOPIC APPENDECTOMY      Surgeons      * Jayme Ireland - Primary    Resident/Fellow/Other Assistant:  Surgeons and Role:     * Tristian Lr PA-C - Assisting    Procedure Summary  Anesthesia: General  ASA: II  Anesthesia Staff: Anesthesiologist: Abdias Stewart MD  CRNA: PONCE Bass-CRNA  Estimated Blood Loss: 5 mL  Intra-op Medications:   Administrations occurring from 1311 to 1441 on 10/22/24:   Medication Name Total Dose   BUPivacaine HCl (Marcaine) 0.5 % (5 mg/mL) injection 16 mL   sodium chloride 0.9 % irrigation solution 1,000 mL   dexAMETHasone (Decadron) injection 4 mg/mL 8 mg   fentaNYL (Sublimaze) injection 50 mcg/mL 100 mcg   ketorolac (Toradol) injection 30 mg 30 mg   LR infusion Cannot be calculated   lidocaine PF (Xylocaine-MPF) local injection 2 % 100 mg   midazolam PF (Versed) injection 1 mg/mL 2 mg   ondansetron (Zofran) 2 mg/mL injection 4 mg   propofol (Diprivan) injection 10 mg/mL 200 mg   rocuronium (ZeMuron) 50 mg/5 mL injection 70 mg   sugammadex (Bridion) 200 mg/2 mL injection 200 mg              Anesthesia Record               Intraprocedure I/O Totals          Intake    LR infusion 350.00 mL    Total Intake 350 mL          Specimen:   ID Type Source Tests Collected by Time   1 : APPENDIX Tissue APPENDIX SURGICAL PATHOLOGY EXAM Jayme Ireland MD 10/22/2024 1257        Staff:   Praveenulator: Idania Rand Person: Nancy  Circulator: Celine         Drains and/or Catheters: * None in log  *    Tourniquet Times:         Implants:     Findings: suppurative appendicitis without perforation     Indications: Fifi Larsen is an 49 y.o. female who is having surgery for Acute appendicitis with localized peritonitis, without perforation, abscess, or gangrene [K35.30].     The patient was seen in the preoperative area. The risks, benefits, complications, treatment options, non-operative alternatives, expected recovery and outcomes were discussed with the patient. The possibilities of reaction to medication, pulmonary aspiration, injury to surrounding structures, bleeding, recurrent infection, the need for additional procedures, failure to diagnose a condition, and creating a complication requiring transfusion or operation were discussed with the patient. The patient concurred with the proposed plan, giving informed consent.  The site of surgery was properly noted/marked if necessary per policy. The patient has been actively warmed in preoperative area. Preoperative antibiotics have been ordered and given within 1 hours of incision. Venous thrombosis prophylaxis have been ordered including bilateral sequential compression devices    Procedure Details: DESCRIPTION:    Patient is diagnosed with appendicitis by exam and CT.  Risks and benefits were detailed. Informed consent for surgery was obtained.    The patient was brought to the OR and placed supine.  Time-out was performed. We confirmed the patient and procedure.  Antibiotics were administered. SCDs were placed on legs.  General anesthesia was given through an endotracheal tube.    We prepped and draped sterilely, injected Marcaine, made an umbilical incision with knife. Sharp incision was made in the fascia. We entered peritoneal cavity, placed Charles trocar.  We insufflated and then placed 5 mm port x2 in the left lower quadrant.  We tilted left side down and placed in slight Trendelenburg. Appendix was quickly identified.  It was suppurative, but  non-perforated, non-gangrenous.     It was mobilized anteromedially.  We divided the mesentery with the LigaSure device.  Appendix was then divided at the junction of the cecum with the Endo-KINGS stapler.  Appendix was put in the EndoCatch bag, ultimately brought out through the umbilicus.  I was not happy with residual stump coming off the cecum so I went ahead and excised that using another firing of the Endo KINGS stapler.  The small stump was then brought out using a bowel grasper through the umbilical port.  We irrigated copiously and aspirated the clear effluent.  The staple line noted to be hemostatic. No abscess cavities were seen.  Ports were removed under direct visualization.  Abdomen was desufflated.  I closed the umbilical fascia with 0-Vicryl.  Skin was closed with 4-0 Biosyn and Dermabond.  The patient went to the recovery room in satisfactory condition.    Complications:  None; patient tolerated the procedure well.    Disposition: PACU - hemodynamically stable.  Condition: stable         Additional Details:     Attending Attestation: I was present for the entire procedure.    Jayme Ireland  Phone Number: [phone]

## 2024-10-22 NOTE — ANESTHESIA PREPROCEDURE EVALUATION
"Patient: Fifi Larsen    Procedure Information       Date/Time: 10/22/24 1311    Procedure: Appendectomy Laparoscopy    Location: [facility] OR 09 / [facility] OR    Surgeons: Jayme Ireland MD            Relevant Problems   No relevant active problems       Clinical information reviewed:   Tobacco  Allergies  Meds   Med Hx  Surg Hx  OB Status  Fam Hx  Soc   Hx         Past Medical History:   Diagnosis Date    Acute appendicitis     RLQ abdominal pain       Past Surgical History:   Procedure Laterality Date    BREAST LUMPECTOMY Left      Social History     Tobacco Use    Smoking status: Never    Smokeless tobacco: Never   Substance Use Topics    Alcohol use: Yes     Comment: occasional    Drug use: Never      No current outpatient medications   No Known Allergies     Chemistry    Lab Results   Component Value Date/Time     10/22/2024 0443    K 3.6 10/22/2024 0443     10/22/2024 0443    CO2 25 10/22/2024 0443    BUN 11 10/22/2024 0443    CREATININE 0.84 10/22/2024 0443    Lab Results   Component Value Date/Time    CALCIUM 8.3 (L) 10/22/2024 0443    ALKPHOS 47 10/21/2024 1643    AST 15 10/21/2024 1643    ALT 15 10/21/2024 1643    BILITOT 0.8 10/21/2024 1643          Lab Results   Component Value Date    HGBA1C 5.2 08/23/2024     Lab Results   Component Value Date/Time    WBC 14.6 (H) 10/22/2024 0443    HGB 12.3 10/22/2024 0443    HCT 37.1 10/22/2024 0443     10/22/2024 0443     No results found for: \"PROTIME\", \"PTT\", \"INR\"  No results found for: \"ABORH\"  No results found for this or any previous visit (from the past 4464 hours).  No results found for this or any previous visit from the past 1095 days.       Visit Vitals  /68   Pulse 64   Temp 37.2 °C (99 °F) (Temporal)   Resp 17   Ht 1.626 m (5' 4\")   Wt 63.5 kg (140 lb)   LMP 10/22/2024 (Exact Date) Comment: HCG NEGATIVE   SpO2 99%   BMI 24.03 kg/m²   OB Status Having periods   Smoking Status Never   BSA 1.69 m²     No data " recorded    Physical Exam    Airway  Mallampati: II  TM distance: >3 FB  Neck ROM: full     Cardiovascular - normal exam     Dental - normal exam     Pulmonary - normal exam     Abdominal - normal exam             Anesthesia Plan    History of general anesthesia?: yes  History of complications of general anesthesia?: no    ASA 2 - emergent     general     The patient is not a current smoker.    intravenous induction   Postoperative administration of opioids is intended.  Anesthetic plan and risks discussed with patient.  Use of blood products discussed with patient who.    Plan discussed with CRNA and attending.

## 2024-10-23 VITALS
WEIGHT: 139.99 LBS | RESPIRATION RATE: 18 BRPM | OXYGEN SATURATION: 96 % | HEIGHT: 64 IN | TEMPERATURE: 97.5 F | SYSTOLIC BLOOD PRESSURE: 122 MMHG | BODY MASS INDEX: 23.9 KG/M2 | DIASTOLIC BLOOD PRESSURE: 85 MMHG | HEART RATE: 62 BPM

## 2024-10-23 PROCEDURE — G0378 HOSPITAL OBSERVATION PER HR: HCPCS

## 2024-10-23 PROCEDURE — 96375 TX/PRO/DX INJ NEW DRUG ADDON: CPT

## 2024-10-23 PROCEDURE — 2500000004 HC RX 250 GENERAL PHARMACY W/ HCPCS (ALT 636 FOR OP/ED): Performed by: SURGERY

## 2024-10-23 PROCEDURE — 9420000001 HC RT PATIENT EDUCATION 5 MIN

## 2024-10-23 PROCEDURE — 96366 THER/PROPH/DIAG IV INF ADDON: CPT

## 2024-10-23 RX ORDER — AMOXICILLIN AND CLAVULANATE POTASSIUM 875; 125 MG/1; MG/1
875 TABLET, FILM COATED ORAL 2 TIMES DAILY
Qty: 14 TABLET | Refills: 0 | Status: SHIPPED | OUTPATIENT
Start: 2024-10-23 | End: 2024-10-30

## 2024-10-23 ASSESSMENT — COGNITIVE AND FUNCTIONAL STATUS - GENERAL
MOBILITY SCORE: 24
DAILY ACTIVITIY SCORE: 24

## 2024-10-23 ASSESSMENT — PAIN DESCRIPTION - DESCRIPTORS: DESCRIPTORS: SORE

## 2024-10-23 ASSESSMENT — PAIN SCALES - GENERAL: PAINLEVEL_OUTOF10: 2

## 2024-10-23 ASSESSMENT — PAIN - FUNCTIONAL ASSESSMENT: PAIN_FUNCTIONAL_ASSESSMENT: 0-10

## 2024-10-23 NOTE — DISCHARGE SUMMARY
Discharge Diagnosis  Acute appendicitis    Issues Requiring Follow-Up  POV    Test Results Pending At Discharge  Pending Labs       Order Current Status    Surgical Pathology Exam In process          Hospital Course  49 yr old female with acute appendicitis s/p appendectomy on 10/23/24 with Dr. Ireland. Please see operative report for full details. Patient tolerated the procedure well and recovered briefly in PACU before being transitioned to regular nursing floor. Post-op course was uncomplicated. Diet was advanced as tolerated.  IV medication transitioned to oral as diet advanced. On the day of discharge, the pt was tolerating a diet, pain was controlled on PO pain medication, and they were ambulating and voiding spontaneously. They were discharged 10/23/24 in stable condition with instructions to follow up as outpatient.    Pertinent Physical Exam At Time of Discharge  PE:  Constitutional: A&Ox3, calm and cooperative, NAD  Eyes: PERRL, clear sclera   Head/Neck: Neck supple  Cardiovascular: Normal rate and regular rhythm.  Respiratory/Thorax: Good symmetric chest expansion. No labored breathing.  Gastrointestinal: Abdomen nondistended, soft, appropriately tender, passing flatus, tolerating diet  Genitourinary: Voiding independently  Extremities: No peripheral edema  Neurological: A&Ox3, No focal deficits  Psychological: Appropriate mood and behavior  Skin: Warm and dry    Home Medications     Medication List      START taking these medications     amoxicillin-pot clavulanate 875-125 mg tablet; Commonly known as:   Augmentin; Take 1 tablet (875 mg) by mouth 2 times a day for 7 days.       Outpatient Follow-Up  No future appointments.    Thomas Stinson PA-C

## 2024-10-23 NOTE — DISCHARGE INSTRUCTIONS
Instructions After Laparoscopic Surgery:    Augmentin antibiotic prescribed. Please take this as directed to finish your antibiotic course.    Wound Care:  -Ice packs to wounds every hour the first day  -Ok to shower in 24 hours  -no baths or swimming for 2 weeks  -keep wound clean and dry  -do not apply topical creams or ointments  -call if you notice redness around wound, foul-smelliing drainage, or increasing pain     Diet:          - GI soft low residual as discussed with Dietary          - Impact Advance Shakes 3 times a day for 5 days    Activity          -Take it easy for the first 48 hours          -stairs and walks are fine          -resume activities gradually over the first week          -ask Dr Ireland before resuming strenuous physical exertions          -No driving while on pain medication    Medications          - Tylenol as needed for mild-moderate pain.          - Please take oxycodone as needed for severe pain only. Pain medication slows down bowel function so avoid taking unless neccessary.          - Resume your home medications unless otherwise directed    Other Instructions          -Call the doctor for the following:   severe unrelieved pain   fevers > 101F   Nausea/vomiting   wound issues   insurance/return to work forms   shortness of breath   chest pains    Other Instructions:  It is important to drink adequate fluid and avoid dehydration. Signs of dehydration include dark urine, decreased frequency in urine, pale mucous membrane, or dry mucous membranes. You should drink at least 8 8oz glasses of fluids a day and it should be a variety of fluids (water, juice, tea, coffee, etc.).  If you start to experience nausea, emesis, fever, or abdominal pain please call Dr. Ireland' office.

## 2024-10-23 NOTE — PROGRESS NOTES
10/23/24 0907   Discharge Planning   Home or Post Acute Services None   Expected Discharge Disposition Home     Patient had lap appy yesterday. Anticipate discharge home today  if patient is able to tolerate PO intake, labs are WNL and pain is controlled.  No home going needs identified at this time.

## 2024-10-28 LAB
LABORATORY COMMENT REPORT: NORMAL
PATH REPORT.FINAL DX SPEC: NORMAL
PATH REPORT.GROSS SPEC: NORMAL
PATH REPORT.RELEVANT HX SPEC: NORMAL
PATH REPORT.TOTAL CANCER: NORMAL

## 2024-11-07 ENCOUNTER — OFFICE VISIT (OUTPATIENT)
Dept: SURGERY | Facility: CLINIC | Age: 49
End: 2024-11-07
Payer: COMMERCIAL

## 2024-11-07 VITALS
BODY MASS INDEX: 24.48 KG/M2 | DIASTOLIC BLOOD PRESSURE: 86 MMHG | SYSTOLIC BLOOD PRESSURE: 118 MMHG | HEIGHT: 64 IN | WEIGHT: 143.4 LBS | TEMPERATURE: 97.7 F | HEART RATE: 68 BPM

## 2024-11-07 DIAGNOSIS — Z09 SURGERY FOLLOW-UP: Primary | ICD-10-CM

## 2024-11-07 DIAGNOSIS — Z90.49 S/P LAPAROSCOPIC APPENDECTOMY: ICD-10-CM

## 2024-11-07 PROCEDURE — 99211 OFF/OP EST MAY X REQ PHY/QHP: CPT | Performed by: SURGERY

## 2024-11-07 PROCEDURE — 1036F TOBACCO NON-USER: CPT | Performed by: SURGERY

## 2024-11-07 RX ORDER — MULTIVITAMIN
1 TABLET ORAL
COMMUNITY

## 2024-11-07 ASSESSMENT — ENCOUNTER SYMPTOMS: DEPRESSION: 0

## 2024-11-07 ASSESSMENT — PAIN SCALES - GENERAL: PAINLEVEL_OUTOF10: 0-NO PAIN

## 2024-11-07 NOTE — LETTER
November 7, 2024     Kevin Benavidez MD  [address]  [place] [address]    Patient: Carla Larsen   YOB: 1975   Date of Visit: 11/7/2024       Dear Dr. Kevin Benavidez MD:    Thank you for referring Carla Larsen to me for evaluation. Below are my notes for this consultation.  If you have questions, please do not hesitate to call me. I look forward to following your patient along with you.       Sincerely,     Jayme Ireland MD      CC: No Recipients  ______________________________________________________________________________________    Assessment/Plan  Making terrific progress following recent lap appendectomy  -We reviewed intra-operative findings and final pathology report  -Wounds healing well.  can resume activity as tolerated, including exercise  -Call for fevers, chills, worsening abdominal pain, wound issues, etc  -See me prn    Subjective  Ms. Larsen following up after laparoscopic appendectomy.  Not having any pain or fevers.  Feels well       Objective    Physical Exam  NAD  A&Ox3  Non icteric  CTA  RR  Abdomen soft min tender. Wounds clean, intact  Extremities warm, well perfused         Relevant Results      No results found for this or any previous visit (from the past 24 hours).  Collected 10/22/2024 12:57       Status: Final result       Visible to patient: Yes (seen)       Dx: Acute appendicitis with localized per...    0 Result Notes       Component  Resulting Agency   FINAL DIAGNOSIS   A. APPENDIX:      -- ACUTE APPENDICITIS.      Electronically signed by Karyn Dan MD on 10/28/2024 at 1052      [facility]   By the signature on this report, the individual or group listed as making the Final Interpretation/Diagnosis certifies that they have reviewed this case.    Clinical History  [facility]   Pre-op diagnosis:            I spent 25 minutes in the professional and overall care of this patient.      Jayme Ireland MD

## 2024-11-07 NOTE — PROGRESS NOTES
Assessment/Plan   Making terrific progress following recent lap appendectomy  -We reviewed intra-operative findings and final pathology report  -Wounds healing well.  can resume activity as tolerated, including exercise  -Call for fevers, chills, worsening abdominal pain, wound issues, etc  -See me prn    Subjective   Ms. Larsen following up after laparoscopic appendectomy.  Not having any pain or fevers.  Feels well       Objective     Physical Exam  NAD  A&Ox3  Non icteric  CTA  RR  Abdomen soft min tender. Wounds clean, intact  Extremities warm, well perfused         Relevant Results      No results found for this or any previous visit (from the past 24 hours).  Collected 10/22/2024 12:57       Status: Final result       Visible to patient: Yes (seen)       Dx: Acute appendicitis with localized per...    0 Result Notes       Component  Resulting Agency   FINAL DIAGNOSIS   A. APPENDIX:      -- ACUTE APPENDICITIS.      Electronically signed by Karyn Dan MD on 10/28/2024 at [address] [place] [address]   By the signature on this report, the individual or group listed as making the Final Interpretation/Diagnosis certifies that they have reviewed this case.    Clinical History  [facility]   Pre-op diagnosis:            I spent 25 minutes in the professional and overall care of this patient.      Jayme Ireland MD

## 2025-03-15 ENCOUNTER — APPOINTMENT (OUTPATIENT)
Dept: RADIOLOGY | Facility: HOSPITAL | Age: 50
End: 2025-03-15
Payer: COMMERCIAL

## 2025-03-15 ENCOUNTER — HOSPITAL ENCOUNTER (EMERGENCY)
Facility: HOSPITAL | Age: 50
Discharge: HOME | End: 2025-03-15
Payer: COMMERCIAL

## 2025-03-15 VITALS
HEIGHT: 64 IN | DIASTOLIC BLOOD PRESSURE: 64 MMHG | TEMPERATURE: 97.7 F | HEART RATE: 66 BPM | BODY MASS INDEX: 23.05 KG/M2 | WEIGHT: 135 LBS | SYSTOLIC BLOOD PRESSURE: 93 MMHG | RESPIRATION RATE: 16 BRPM | OXYGEN SATURATION: 99 %

## 2025-03-15 DIAGNOSIS — S82.831A CLOSED FRACTURE OF DISTAL END OF RIGHT FIBULA, UNSPECIFIED FRACTURE MORPHOLOGY, INITIAL ENCOUNTER: Primary | ICD-10-CM

## 2025-03-15 PROCEDURE — 73590 X-RAY EXAM OF LOWER LEG: CPT | Mod: RIGHT SIDE | Performed by: RADIOLOGY

## 2025-03-15 PROCEDURE — 99284 EMERGENCY DEPT VISIT MOD MDM: CPT | Mod: 25

## 2025-03-15 PROCEDURE — 73610 X-RAY EXAM OF ANKLE: CPT | Mod: RIGHT SIDE | Performed by: RADIOLOGY

## 2025-03-15 PROCEDURE — 73610 X-RAY EXAM OF ANKLE: CPT | Mod: RT

## 2025-03-15 PROCEDURE — 73590 X-RAY EXAM OF LOWER LEG: CPT | Mod: RT

## 2025-03-15 PROCEDURE — 29515 APPLICATION SHORT LEG SPLINT: CPT | Mod: RT

## 2025-03-15 ASSESSMENT — PAIN - FUNCTIONAL ASSESSMENT: PAIN_FUNCTIONAL_ASSESSMENT: 0-10

## 2025-03-15 ASSESSMENT — PAIN SCALES - GENERAL: PAINLEVEL_OUTOF10: 7

## 2025-03-15 NOTE — ED PROVIDER NOTES
HPI   Chief Complaint   Patient presents with    Ankle Pain       Is a pleasant 49-year-old female who inverted her right ankle last evening she developed pain distally in the leg.  Some discomfort by the knee as well.  She denies other injury.              Patient History   Past Medical History:   Diagnosis Date    Acute appendicitis     RLQ abdominal pain      Past Surgical History:   Procedure Laterality Date    BREAST LUMPECTOMY Left      No family history on file.  Social History     Tobacco Use    Smoking status: Never    Smokeless tobacco: Never   Substance Use Topics    Alcohol use: Yes     Comment: occasional    Drug use: Never       Physical Exam   ED Triage Vitals [03/15/25 1026]   Temperature Heart Rate Respirations BP   36.5 °C (97.7 °F) 66 16 93/64      Pulse Ox Temp src Heart Rate Source Patient Position   99 % -- -- --      BP Location FiO2 (%)     -- --       Physical Exam  Vitals reviewed.   Constitutional:       General: She is not in acute distress.     Appearance: Normal appearance. She is normal weight. She is not ill-appearing, toxic-appearing or diaphoretic.   HENT:      Head: Normocephalic and atraumatic.      Right Ear: External ear normal.      Left Ear: External ear normal.      Nose: Nose normal.   Eyes:      Extraocular Movements: Extraocular movements intact.      Conjunctiva/sclera: Conjunctivae normal.      Pupils: Pupils are equal, round, and reactive to light.   Cardiovascular:      Rate and Rhythm: Normal rate and regular rhythm.   Pulmonary:      Effort: Pulmonary effort is normal. No respiratory distress.      Breath sounds: No stridor.   Abdominal:      General: There is no distension.   Musculoskeletal:         General: Tenderness present. No swelling or deformity.      Cervical back: Normal range of motion.      Right ankle: Swelling present. Tenderness present over the lateral malleolus.        Legs:    Skin:     Capillary Refill: Capillary refill takes less than 2 seconds.       Findings: No rash.   Neurological:      General: No focal deficit present.      Mental Status: She is alert and oriented to person, place, and time. Mental status is at baseline.   Psychiatric:         Mood and Affect: Mood normal.         Behavior: Behavior normal.         Thought Content: Thought content normal.         Judgment: Judgment normal.           ED Course & MDM   Diagnoses as of 03/15/25 1853   Closed fracture of distal end of right fibula, unspecified fracture morphology, initial encounter                 No data recorded                                 Medical Decision Making  Differential diagnosis fracture sprain, dislocation    Splint was placed by nursing and post-splint exam performed by me neurovascular intact good alignment on postplacement.  Patient provided with crutches orthopedics follow-up.  Return precautions were also given    Offered stronger medication than OTCs and patient declined        Amount and/or Complexity of Data Reviewed  Radiology: independent interpretation performed.     Details: Distal fibula fracture    Risk  OTC drugs.        Procedure  Procedures     Mario Rosen PA-C  03/15/25 4847

## 2025-03-17 ENCOUNTER — HOSPITAL ENCOUNTER (OUTPATIENT)
Dept: RADIOLOGY | Facility: CLINIC | Age: 50
Discharge: HOME | End: 2025-03-17
Payer: COMMERCIAL

## 2025-03-17 ENCOUNTER — APPOINTMENT (OUTPATIENT)
Dept: RADIOLOGY | Facility: CLINIC | Age: 50
End: 2025-03-17
Payer: COMMERCIAL

## 2025-03-17 ENCOUNTER — OFFICE VISIT (OUTPATIENT)
Dept: ORTHOPEDIC SURGERY | Facility: CLINIC | Age: 50
End: 2025-03-17
Payer: COMMERCIAL

## 2025-03-17 DIAGNOSIS — S82.831A CLOSED FRACTURE OF DISTAL END OF RIGHT FIBULA, UNSPECIFIED FRACTURE MORPHOLOGY, INITIAL ENCOUNTER: ICD-10-CM

## 2025-03-17 DIAGNOSIS — S82.821A CLOSED TORUS FRACTURE OF DISTAL END OF RIGHT FIBULA, INITIAL ENCOUNTER: Primary | ICD-10-CM

## 2025-03-17 DIAGNOSIS — S82.821A CLOSED TORUS FRACTURE OF DISTAL END OF RIGHT FIBULA, INITIAL ENCOUNTER: ICD-10-CM

## 2025-03-17 PROCEDURE — 99214 OFFICE O/P EST MOD 30 MIN: CPT | Mod: 57 | Performed by: ORTHOPAEDIC SURGERY

## 2025-03-17 PROCEDURE — 1036F TOBACCO NON-USER: CPT | Performed by: ORTHOPAEDIC SURGERY

## 2025-03-17 PROCEDURE — 73610 X-RAY EXAM OF ANKLE: CPT | Mod: RT

## 2025-03-17 PROCEDURE — 99204 OFFICE O/P NEW MOD 45 MIN: CPT | Performed by: ORTHOPAEDIC SURGERY

## 2025-03-17 RX ORDER — CEFAZOLIN SODIUM 2 G/100ML
2 INJECTION, SOLUTION INTRAVENOUS ONCE
Status: CANCELLED | OUTPATIENT
Start: 2025-03-17 | End: 2025-03-17

## 2025-03-17 NOTE — PROGRESS NOTES
Fifi EMILIANO Larsen    CHIEF COMPLAINT:  Chief Complaint   Patient presents with    Right Ankle - Pain     Patient fell down a step in heels         HISTORY OF PRESENT ILLNESS:  This is a 49 y.o. female who presents today with  rolled ankle Friday, ED Saturday  Unable to bear weight. Presents today for evaluation of the ankle. Severe lateral and medial ankle pain.     Occupation:   (works form home)  Nicotine (Smoking/Vaping) History: non-smoker  Personal or Family Hx of DVT/PE: Yes, describe: Dad - stroke  Metal Allergy: No  Diabetic:   No  Last Hgba1c:   Lab Results   Component Value Date    HGBA1C 5.2 08/23/2024       Assessment/Plan:  1. Closed torus fracture of distal end of right fibula, initial encounter (Primary)  - XR ankle right 3+ views; Future  - Request for Pre-Admission Testing Visit; Future  - Case Request Operating Room: ORIF, FRACTURE, FIBULA; Standing  - Case Request Operating Room: ORIF, FRACTURE, FIBULA    2. Closed fracture of distal end of right fibula, unspecified fracture morphology, initial encounter  - Referral to Orthopaedic Surgery  - Request for Pre-Admission Testing Visit; Future    We discussed that they sustained a distal fibula fracture.  We discussed that this fracture appears to be unstable on weightbearing x-rays today, with both talar shift and medial clear space widening.  I discussed my recommendation of surgery. She expressed understanding    We discussed the surgery in detail, including the expected recovery, outcomes, risks and benefits. They will be non-weight bearing for 6 weeks after surgery. They understand it will take them ~12 months to fully recover from the surgery.     The risks and benefits of surgery were discussed today, including, but not limited to: bleeding ,infection, damage to blood vessels and nerves, nerve pain, wound healing problems, nonunion, malunion, hardware failure, implant failure, blood clots, and incomplete relief of symptoms. They  understood, and agreed to go ahead.     My office will begin scheduling surgery.    Post-op pain medication will be Norco 5/325 mg 1 tab q 4 hrs (#42)  We discussed DVT Prophylaxis, and they will use  mg BID   Pre-operative antibiotics will be Ancef    Follow up 1 week post-op w/ NWB R ankle films out of splint    Thank you for the opportunity to participate in this patient's care.    Physical Exam:  Well appearing female in no acute distress; Alert and oriented.  Left Lower Extremity:   Grossly intact ROM and strength, no obvious deformity.  Right  Lower Extremity:  Gait Cycle:  Non-ambulatory  Inspection:  Swelling: Yes Redness: No  Ecchymosis: Yes Effusion: Yes    Alignment: no angular deformity  Pain on palpation:  Lateral malleolus and Deltoid  ROM: limited by pain  Strength: limited by pain  Neurologic Status:  Sensation to all 4 compartments of lower extremity are grossly intact to light touch today in the office.  Vascular Status:   Tibialis posterior pulse: present  Dorsalis pedis pulse: present  Skin:  Normal        IMAGING:     Imaging was ordered today. Final results and radiologist's interpretation, available in the [facility] health record. Images were reviewed with the patient/family members in the office today. My personal interpretation of the performed imaging is fibula fracture with lateral displacement - medial clear space widening and lateral translation of the talus.     Celi Thao MD

## 2025-03-18 ENCOUNTER — PRE-ADMISSION TESTING (OUTPATIENT)
Dept: PREADMISSION TESTING | Facility: HOSPITAL | Age: 50
End: 2025-03-18
Payer: COMMERCIAL

## 2025-03-18 VITALS
TEMPERATURE: 98.8 F | DIASTOLIC BLOOD PRESSURE: 90 MMHG | HEART RATE: 68 BPM | RESPIRATION RATE: 16 BRPM | WEIGHT: 145.28 LBS | HEIGHT: 64 IN | BODY MASS INDEX: 24.8 KG/M2 | SYSTOLIC BLOOD PRESSURE: 124 MMHG | OXYGEN SATURATION: 98 %

## 2025-03-18 DIAGNOSIS — Z01.818 PREOP TESTING: Primary | ICD-10-CM

## 2025-03-18 DIAGNOSIS — S82.821A CLOSED TORUS FRACTURE OF DISTAL END OF RIGHT FIBULA, INITIAL ENCOUNTER: ICD-10-CM

## 2025-03-18 DIAGNOSIS — S82.831A CLOSED FRACTURE OF DISTAL END OF RIGHT FIBULA, UNSPECIFIED FRACTURE MORPHOLOGY, INITIAL ENCOUNTER: ICD-10-CM

## 2025-03-18 LAB
ALBUMIN SERPL BCP-MCNC: 4.4 G/DL (ref 3.4–5)
ALP SERPL-CCNC: 44 U/L (ref 33–110)
ALT SERPL W P-5'-P-CCNC: 17 U/L (ref 7–45)
ANION GAP SERPL CALC-SCNC: 13 MMOL/L (ref 10–20)
AST SERPL W P-5'-P-CCNC: 19 U/L (ref 9–39)
BASOPHILS # BLD AUTO: 0.02 X10*3/UL (ref 0–0.1)
BASOPHILS NFR BLD AUTO: 0.2 %
BILIRUB SERPL-MCNC: 0.3 MG/DL (ref 0–1.2)
BUN SERPL-MCNC: 15 MG/DL (ref 6–23)
CALCIUM SERPL-MCNC: 9.7 MG/DL (ref 8.6–10.3)
CHLORIDE SERPL-SCNC: 106 MMOL/L (ref 98–107)
CO2 SERPL-SCNC: 24 MMOL/L (ref 21–32)
CREAT SERPL-MCNC: 0.75 MG/DL (ref 0.5–1.05)
EGFRCR SERPLBLD CKD-EPI 2021: >90 ML/MIN/1.73M*2
EOSINOPHIL # BLD AUTO: 0.19 X10*3/UL (ref 0–0.7)
EOSINOPHIL NFR BLD AUTO: 2.2 %
ERYTHROCYTE [DISTWIDTH] IN BLOOD BY AUTOMATED COUNT: 11.7 % (ref 11.5–14.5)
GLUCOSE SERPL-MCNC: 101 MG/DL (ref 74–99)
HCT VFR BLD AUTO: 41 % (ref 36–46)
HGB BLD-MCNC: 13.7 G/DL (ref 12–16)
IMM GRANULOCYTES # BLD AUTO: 0.01 X10*3/UL (ref 0–0.7)
IMM GRANULOCYTES NFR BLD AUTO: 0.1 % (ref 0–0.9)
LYMPHOCYTES # BLD AUTO: 3.07 X10*3/UL (ref 1.2–4.8)
LYMPHOCYTES NFR BLD AUTO: 35 %
MCH RBC QN AUTO: 31.1 PG (ref 26–34)
MCHC RBC AUTO-ENTMCNC: 33.4 G/DL (ref 32–36)
MCV RBC AUTO: 93 FL (ref 80–100)
MONOCYTES # BLD AUTO: 0.61 X10*3/UL (ref 0.1–1)
MONOCYTES NFR BLD AUTO: 6.9 %
NEUTROPHILS # BLD AUTO: 4.88 X10*3/UL (ref 1.2–7.7)
NEUTROPHILS NFR BLD AUTO: 55.6 %
NRBC BLD-RTO: NORMAL /100{WBCS}
PLATELET # BLD AUTO: 312 X10*3/UL (ref 150–450)
POTASSIUM SERPL-SCNC: 3.8 MMOL/L (ref 3.5–5.3)
PROT SERPL-MCNC: 7.2 G/DL (ref 6.4–8.2)
RBC # BLD AUTO: 4.4 X10*6/UL (ref 4–5.2)
SODIUM SERPL-SCNC: 139 MMOL/L (ref 136–145)
WBC # BLD AUTO: 8.8 X10*3/UL (ref 4.4–11.3)

## 2025-03-18 PROCEDURE — 85025 COMPLETE CBC W/AUTO DIFF WBC: CPT

## 2025-03-18 PROCEDURE — 80053 COMPREHEN METABOLIC PANEL: CPT

## 2025-03-18 PROCEDURE — 36415 COLL VENOUS BLD VENIPUNCTURE: CPT

## 2025-03-18 PROCEDURE — 99214 OFFICE O/P EST MOD 30 MIN: CPT | Performed by: NURSE PRACTITIONER

## 2025-03-18 RX ORDER — IBUPROFEN 200 MG
400 TABLET ORAL AS NEEDED
COMMUNITY
End: 2025-03-19 | Stop reason: HOSPADM

## 2025-03-18 ASSESSMENT — DUKE ACTIVITY SCORE INDEX (DASI)
CAN YOU WALK INDOORS, SUCH AS AROUND YOUR HOUSE: YES
CAN YOU DO YARD WORK LIKE RAKING LEAVES, WEEDING OR PUSHING A MOWER: YES
CAN YOU RUN A SHORT DISTANCE: YES
CAN YOU DO HEAVY WORK AROUND THE HOUSE LIKE SCRUBBING FLOORS OR LIFTING AND MOVING HEAVY FURNITURE: YES
CAN YOU PARTICIPATE IN MODERATE RECREATIONAL ACTIVITIES LIKE GOLF, BOWLING, DANCING, DOUBLES TENNIS OR THROWING A BASEBALL OR FOOTBALL: YES
CAN YOU DO LIGHT WORK AROUND THE HOUSE LIKE DUSTING OR WASHING DISHES: YES
CAN YOU PARTICIPATE IN STRENOUS SPORTS LIKE SWIMMING, SINGLES TENNIS, FOOTBALL, BASKETBALL, OR SKIING: YES
CAN YOU HAVE SEXUAL RELATIONS: YES
DASI METS SCORE: 9.9
CAN YOU WALK A BLOCK OR TWO ON LEVEL GROUND: YES
CAN YOU DO MODERATE WORK AROUND THE HOUSE LIKE VACUUMING, SWEEPING FLOORS OR CARRYING GROCERIES: YES
TOTAL_SCORE: 58.2
CAN YOU CLIMB A FLIGHT OF STAIRS OR WALK UP A HILL: YES
CAN YOU TAKE CARE OF YOURSELF (EAT, DRESS, BATHE, OR USE TOILET): YES

## 2025-03-18 ASSESSMENT — PAIN - FUNCTIONAL ASSESSMENT: PAIN_FUNCTIONAL_ASSESSMENT: 0-10

## 2025-03-18 ASSESSMENT — PAIN DESCRIPTION - DESCRIPTORS: DESCRIPTORS: ACHING

## 2025-03-18 ASSESSMENT — PAIN SCALES - GENERAL: PAINLEVEL_OUTOF10: 3

## 2025-03-18 NOTE — CPM/PAT H&P
"CPM/PAT Evaluation       Name: Fifi Larsen (Fifi Larsen \"Carla\")  /Age: 1975/49 y.o.     In-Person       Chief Complaint: Closed torus fracture of distal end of right fibula    HPI  Patient is an alert and oriented 49 year old female scheduled for a  ORIF, FRACTURE, FIBULA on 3/19/25 with Dr. Zimmerman for  3/19/25. PMHX includes leg pain. Presents to [facility] PAT today for perioperative risk stratification and optimization.     Past Medical History:   Diagnosis Date    Acute appendicitis     RLQ abdominal pain        Past Surgical History:   Procedure Laterality Date    BREAST LUMPECTOMY Left        Patient  has no history on file for sexual activity.    No family history on file.    No Known Allergies    Prior to Admission medications    Medication Sig Start Date End Date Taking? Authorizing Provider   BIOTIN ORAL Take by mouth.    Historical Provider, MD   multivitamin tablet Take 1 capsule by mouth once daily.    Historical Provider, MD       Review of Systems   Constitutional: Negative for chills, decreased appetite, diaphoresis, fever and malaise/fatigue.   Eyes:  Negative for blurred vision and double vision.   Cardiovascular:  Negative for chest pain, claudication, cyanosis, dyspnea on exertion, irregular heartbeat, leg swelling, near-syncope and palpitations.   Respiratory:  Negative for cough, hemoptysis, shortness of breath and wheezing.    Endocrine: Negative for cold intolerance, heat intolerance, polydipsia, polyphagia and polyuria.   Gastrointestinal:  Negative for abdominal pain, constipation, diarrhea, dysphagia, nausea and vomiting.   Genitourinary:  Negative for bladder incontinence, dysuria, hematuria, incomplete emptying, nocturia, frequency, pelvic pain and urgency.   Neurological:  Negative for headaches, light-headedness, paresthesias, sensory change and weakness.   Psychiatric/Behavioral:  Negative for altered mental status.    Musculoskeletal: Positive for myalgias, " arthralgias     Vitals and nursing note reviewed.     Physical exam  Constitutional:       Appearance: Normal appearance. She is Normal.   HENT:      Head: Normocephalic and atraumatic.      Mouth/Throat:      Mouth: Mucous membranes are moist.      Pharynx: Oropharynx is clear.   Eyes:      Extraocular Movements: Extraocular movements intact.      Conjunctiva/sclera: Conjunctivae normal.      Pupils: Pupils are equal, round, and reactive to light.   Cardiovascular:      PMI at left midclavicular line. Normal rate. Regular rhythm. Normal S1. Normal S2.       Murmurs: There is no murmur.      No gallop.  No click. No rub.       No audible carotid bruit     No lower extremity edema on exam  Pulmonary:      Effort: Pulmonary effort is normal.      Breath sounds: Normal breath sounds.   Abdominal:      General: Abdomen is flat. Bowel sounds are normal.      Palpations: Abdomen is soft and non-tender  Musculoskeletal:      Cervical back: Normal range of motion and neck supple.   Skin:     General: Skin is warm and dry.      Capillary Refill: Capillary refill takes less than 2 seconds.   Neurological:      General: No focal deficit present.      Mental Status: She is alert and oriented to person, place, and time. Mental status is at baseline.   Psychiatric:         Mood and Affect: Mood normal.         Behavior: Behavior normal.         Thought Content: Thought content normal.         Judgment: Judgment normal.     Vitals and nursing note reviewed. Physical exam within normal limits.       DASI Risk Score      Flowsheet Row Pre-Admission Testing from 3/18/2025 in [facility]   Can you take care of yourself (eat, dress, bathe, or use toilet)?  2.75 filed at 03/18/2025 1643   Can you walk indoors, such as around your house? 1.75 filed at 03/18/2025 1643   Can you walk a block or two on level ground?  2.75 filed at 03/18/2025 1643   Can you climb a flight of stairs or walk up a hill? 5.5 filed at 03/18/2025  1643   Can you run a short distance? 8 filed at 03/18/2025 1643   Can you do light work around the house like dusting or washing dishes? 2.7 filed at 03/18/2025 1643   Can you do moderate work around the house like vacuuming, sweeping floors or carrying groceries? 3.5 filed at 03/18/2025 1643   Can you do heavy work around the house like scrubbing floors or lifting and moving heavy furniture?  8 filed at 03/18/2025 1643   Can you do yard work like raking leaves, weeding or pushing a mower? 4.5 filed at 03/18/2025 1643   Can you have sexual relations? 5.25 filed at 03/18/2025 1643   Can you participate in moderate recreational activities like golf, bowling, dancing, doubles tennis or throwing a baseball or football? 6 filed at 03/18/2025 1643   Can you participate in strenous sports like swimming, singles tennis, football, basketball, or skiing? 7.5 filed at 03/18/2025 1643   DASI SCORE 58.2 filed at 03/18/2025 1643   METS Score (Will be calculated only when all the questions are answered) 9.9 filed at 03/18/2025 1643          Caprini DVT Assessment      Flowsheet Row Pre-Admission Testing from 3/18/2025 in [facility] ED to Hosp-Admission (Discharged) from 10/21/2024 in [facility] Bldg A 1 with Jayme Ireland MD, Tracey Castaneda MD and Leisa Weeks MD   DVT Score (IF A SCORE IS NOT CALCULATING, MUST SELECT A BMI TO COMPLETE) 4 filed at 03/18/2025 1640 4 filed at 10/21/2024 2211   Surgical Factors Major surgery planned, including arthroscopic and laproscopic (1-2 hours) filed at 03/18/2025 1640 Major surgery planned, including arthroscopic and laproscopic (1-2 hours) filed at 10/21/2024 2211   BMI (BMI MUST BE CHOSEN) 30 or less filed at 03/18/2025 1640 30 or less filed at 10/21/2024 2211          Modified Frailty Index    No data to display       [id number] Stroke Risk Points  Current as of just now        N/A 0 to 9 Points:      Last Change: N/A          The  [id number] risk score (Pino WILLS et al. 2009. © 2010 [employer]) quantifies the risk of stroke for a patient with atrial fibrillation. For patients without atrial fibrillation or under the age of 18 this score appears as N/A. Higher score values generally indicate higher risk of stroke.        This score is not applicable to this patient. Components are not calculated.          Revised Cardiac Risk Index      Flowsheet Row Pre-Admission Testing from 3/18/2025 in [facility]   High-Risk Surgery (Intraperitoneal, Intrathoracic,Suprainguinal vascular) 0 filed at 03/18/2025 1644   History of ischemic heart disease (History of MI, History of positive exercuse test, Current chest paint considered due to myocardial ischemia, Use of nitrate therapy, ECG with pathological Q Waves) 0 filed at 03/18/2025 1644   History of congestive heart failure (pulmonary edemia, bilateral rales or S3 gallop, Paroxysmal nocturnal dyspnea, CXR showing pulmonary vascular redistribution) 0 filed at 03/18/2025 1644   History of cerebrovascular disease (Prior TIA or stroke) 0 filed at 03/18/2025 1644   Pre-operative insulin treatment 0 filed at 03/18/2025 1644   Pre-operative creatinine>2 mg/dl 0 filed at 03/18/2025 1644   Revised Cardiac Risk Calculator 0 filed at 03/18/2025 1644          Apfel Simplified Score    No data to display       Risk Analysis Index Results This Encounter    No data found in the last 10 encounters.       Stop Bang Score      Flowsheet Row Pre-Admission Testing from 3/18/2025 in [facility]   Do you snore loudly? 0 filed at 03/18/2025 1603   Do you often feel tired or fatigued after your sleep? 0 filed at 03/18/2025 1603   Has anyone ever observed you stop breathing in your sleep? 0 filed at 03/18/2025 1603   Do you have or are you being treated for high blood pressure? 0 filed at 03/18/2025 1603   Recent BMI (Calculated) 24.9 filed at 03/18/2025 1603   Is BMI  greater than 35 kg/m2? 0=No filed at 03/18/2025 1603   Age older than 50 years old? 0=No filed at 03/18/2025 1603   Is your neck circumference greater than 17 inches (Male) or 16 inches (Female)? 0 filed at 03/18/2025 1603   Gender - Male 0=No filed at 03/18/2025 1603   STOP-BANG Total Score 0 filed at 03/18/2025 1603          Prodigy: High Risk  Total Score: 0          ARISCAT Score for Postoperative Pulmonary Complications    No data to display       Lisa Perioperative Risk for Myocardial Infarction or Cardiac Arrest (JOSE LUIS)      Flowsheet Row Pre-Admission Testing from 3/18/2025 in [facility]   Calculated Age Score 1 filed at 03/18/2025 1645   Functional Status  0 filed at 03/18/2025 1645   ASA Class  -5.17 filed at 03/18/2025 1645   Creatinine 0 filed at 03/18/2025 1645   Type of Procedure  0.80 filed at 03/18/2025 1645   JOSE LUIS Total Score  -8.62 filed at 03/18/2025 1645   JOSE LUIS % 0.02 filed at 03/18/2025 1645            Assessment & Plan:    Neuro:  No diagnosis or significant findings on chart review or clinical presentation and evaluation.     HEENT/Airway:  No diagnosis or significant findings on chart review or clinical presentation and evaluation.   STOP-BANG Score- 1 points low risk for ERIKA    Mallampati::  II    TM distance::  >3 FB    Neck ROM::  Full  Dentures-denies  Crowns-reports x 2  Implants-denies    Cardiovascular:  No diagnosis or significant findings on chart review or clinical presentation and evaluation.   METS: 9.9  RCRI: 0 points, 3.9%  risk for postoperative MACE   JOSE LUIS: 0.02% risk for postoperative MACE  EKG -not indicated, no HTN, no cardiac history     Pulmonary:  No diagnosis or significant findings on chart review or clinical presentation and evaluation.   ARISCAT: <26 points, 1.6% risk of in-hospital postoperative pulmonary complication  PRODIGY: Low risk for opioid induced respiratory depression  Smoking History-She has never smoked.  Discussed smoking cessation and  deep breathing handout given    Renal/Urinary:  No diagnosis or significant findings on chart review or clinical presentation and evaluation. Preventative measures include BP monitoring, medication compliance, and hydration management.   CMP-Pending  Creatinine-  GFR-    Endocrine:  No diagnosis or significant findings on chart review or clinical presentation and evaluation.   [id number]    Hematologic/Immunology:  Breast clip/marker   The patient is not a Jehovah’s witness and will accept blood and blood products if medically indicated.   History of previous blood transfusions No  CBC-Pending  HGB-Pending  Caprini Score 4, patient at Low for postoperative DVT. Pt supplied education/VTE handout  Anticoagulation use: No     Gastrointestinal:   S/p appendectomy 10/22/24  Recreational drug use: none  Alcohol use social drinker    Infectious disease:   No diagnosis or significant findings on chart review or clinical presentation and evaluation.     Musculoskeletal:   Closed torus fracture of distal end of right fibula  JHFRAT score- 5 points. low risk for falls    Anesthesia:  ASA 2 - Patient with mild systemic disease with no functional limitations  Anticipated anesthesia-general  History of General anesthesia- yes  Complications- No anesthesia complications  No family history of anesthesia complications    Labs & Imaging ordered:  CBC, CMP  Nickel/metal allergy-negative  Shellfish allergy-negative    Discussed with patient medication instructions, NPO guidelines, and any questions or concerns.      time spent  35

## 2025-03-18 NOTE — PREPROCEDURE INSTRUCTIONS
Medication List            Accurate as of March 18, 2025  4:17 PM. Always use your most recent med list.                BIOTIN ORAL  Additional Medication Adjustments for Surgery: Take last dose 7 days before surgery  Notes to patient: Please stop this medication if you have not already, your surgery is tomorrow     FERROUS SULFATE ORAL  Medication Adjustments for Surgery: Do Not take on the morning of surgery     ibuprofen 200 mg tablet  Additional Medication Adjustments for Surgery: Take last dose 7 days before surgery  Notes to patient: Please stop this medication if you have not already, your surgery is tomorrow     MAGNESIUM ORAL  Medication Adjustments for Surgery: Do Not take on the morning of surgery     multivitamin tablet  Additional Medication Adjustments for Surgery: Take last dose 7 days before surgery  Notes to patient: Please stop this medication if you have not already, your surgery is tomorrow     VITAMIN D3 ORAL  Additional Medication Adjustments for Surgery: Take last dose 7 days before surgery  Notes to patient: Please stop this medication if you have not already, your surgery is tomorrow            NPO Instructions:     Do not eat any food after midnight the night before your surgery/procedure.  You may have clear liquids until TWO hours before surgery/procedure. This includes water, black tea/coffee, (no milk or cream) apple juice and electrolyte drinks (Gatorade).  You may chew gum up to TWO hours before your surgery/procedure.     Additional Instructions:      Seven/Six Days before Surgery:  Review your medication instructions, stop indicated medications  Five Days before Surgery:  Review your medication instructions, stop indicated medications  Three Days before Surgery:  Review your medication instructions, stop indicated medications  The Day before Surgery:  No smoking or alcohol use 24 hours before surgery  Review your medication instructions, stop indicated medications  You will be  contacted regarding the time of your arrival to facility and surgery time  Do not eat any food after Midnight  Day of Surgery:  Review your medication instructions, take indicated medications  If you have diabetes, please check your fasting blood sugar upon awakening.  If fasting blood sugar is <80 mg/dl, drink 100 ml of apple juice, time limit of 2 hours before  You may have clear liquids until TWO hours before surgery/procedure.  This includes water, black tea/coffee, (no milk or cream) apple juice and electrolyte drinks (Gatorade)  You may chew gum up to TWO hours before your surgery/procedure  Wear  comfortable loose fitting clothing  Do not use moisturizers, creams, lotions or perfume  All jewelry and valuables should be left at home     CONTACT SURGEON'S OFFICE IF YOU DEVELOP:  * Fever = 100.4 F   * New respiratory symptoms (e.g. cough, shortness of breath, respiratory distress, sore throat)  * Recent loss of taste or smell  *Flu like symptoms such as headache, fatigue or gastrointestinal symptoms  * You develop any open sores, shingles, burning or painful urination   AND/OR:  * You no longer wish to have the surgery.  * Any other personal circumstances change that may lead to the need to cancel or defer this surgery.  *You were admitted to any hospital within one week of your planned procedure.     SMOKING:  *Quitting smoking can make a huge difference to your health and recovery from surgery.    *If you need help with quitting, call 7-800QUIT-NOW.     THE DAY BEFORE SURGERY:  *Do not eat any food after midnight the night before your surgery.   *You may have up to TEN OUNCES of clear liquids until TWO hours before your instructed ARRIVAL TIME to hospital. This includes water, black tea/coffee, (no milk or cream) apple juice, clear broth and electrolyte drinks (Gatorade). Please avoid clear liquids that are red in color.   *You may chew gum/mints up to TWO hours before your surgery/procedure.     SURGICAL  TIME:  *You will be contacted between 2 p.m. and 3 p.m. the business day before your surgery with your arrival time.  *If you haven't received a call by 3pm, call [phone]  *Scheduled surgery times may change and you will be notified if this occurs-check your personal voicemail for any updates.     ON THE MORNING OF SURGERY:  *Wear comfortable, loose fitting clothing.   *Do not use moisturizers, creams, lotions or perfume.  *All jewelry and valuables should be left at home.  *Prosthetic devices such as contact lenses, hearing aids, dentures, eyelash extensions, hairpins and body piercing must be removed before surgery.     BRING WITH YOU:  *Photo ID and insurance card  *Current list of medications and allergies  *Pacemaker/Defibrillator/Heart stent cards  *CPAP machine and mask  *Slings/splints/crutches  *Copy of your complete Advanced Directive/DHPOA-if applicable  *Neurostimulator implant remote     PARKING AND ARRIVAL:  *Check in at the Main Entrance desk and let them know you are here for surgery.     IF YOU ARE HAVING OUTPATIENT/SAME DAY SURGERY:  *A responsible adult MUST accompany you at the time of discharge and stay with you for 24 hours after your surgery.  *You may NOT drive yourself home after surgery.  *You may use a taxi or ride sharing service ([employer], [employer]) to return home ONLY if you are accompanied by a friend or family member.  *Instructions for resuming your medications will be provided by your surgeon.     Thank you for coming to Pre Admission testing.      If I have prescribed medication please don't forget to  at your pharmacy.      Any questions about today's visit call [phone] and leave a message in the general mailbox.     Patient instructed to ambulate as soon as possible postoperatively to decrease thromboembolic risk.       Thank you for visiting [facility].  If you have any changes to your health condition, please call the surgeons office to  alert them and give them details of your symptoms.        Preoperative Fasting Guidelines     Why must I stop eating and drinking near surgery time?  With sedation, food or liquid in your stomach can enter your lungs causing serious complications  Increases nausea and vomiting     When do I need to stop eating and drinking before my surgery?  Do not eat any food after midnight the night before your surgery/procedure.  You may have up to TEN ounces of clear liquid until TWO hours before your instructed arrival time to the hospital.  This includes water, black tea/coffee, (no milk or cream) apple juice, and electrolyte drinks (Gatorade)  You may chew gum until TWO hours before your surgery/procedure        Additional Instructions:      The Day before Surgery:  -Review your medication instructions, stop indicated medications  -You will be contacted in the evening regarding the time of your arrival to facility and surgery time     Day of Surgery:  -Review your medication instructions, take indicated medications  -Wear comfortable loose fitting clothing  -Do not use moisturizers, creams, lotions or perfume  -All jewelry and valuables should be left at home                   Preoperative Brain Exercises     What are brain exercises?  A brain exercise is any activity that engages your thinking (cognitive) skills.     What types of activities are considered brain exercises?  Jigsaw puzzles, crossword puzzles, word jumble, memory games, word search, and many more.  Many can be found free online or on your phone via a mobile kaleb.     Why should I do brain exercises before my surgery?  More recent research has shown brain exercise before surgery can lower the risk of postoperative delirium (confusion) which can be especially important for older adults.  Patients who did brain exercises for 5 to 10 minutes/day in the days before surgery, cut their risk of postoperative delirium in half up to 1 week after surgery.                          [facility]     Preoperative Deep Breathing Exercises     Why it is important to do deep breathing exercises before my surgery?  Deep breathing exercises strengthen your breathing muscles.  This helps you to recover after your surgery and decreases the chance of breathing complications.        How are the deep breathing exercises done?  Sit straight with your back supported.  Breathe in deeply and slowly through your nose. Your lower rib cage should expand and your abdomen may move forward.  Hold that breath for 3 to 5 seconds.  Breathe out through pursed lips, slowly and completely.  Rest and repeat 10 times every hour while awake.  Rest longer if you become dizzy or lightheaded.                      [facility]     Preoperative Deep Breathing Exercises     Why it is important to do deep breathing exercises before my surgery?  Deep breathing exercises strengthen your breathing muscles.  This helps you to recover after your surgery and decreases the chance of breathing complications.        How are the deep breathing exercises done?  Sit straight with your back supported.  Breathe in deeply and slowly through your nose. Your lower rib cage should expand and your abdomen may move forward.  Hold that breath for 3 to 5 seconds.  Breathe out through pursed lips, slowly and completely.  Rest and repeat 10 times every hour while awake.  Rest longer if you become dizzy or lightheaded.        Patient Information: Incentive Spirometer  What is an incentive spirometer?  An incentive spirometer is a device used before and after surgery to “exercise” your lungs.  It helps you to take deeper breaths to expand your lungs.  Below is an example of a basic incentive spirometer.  The device you receive may differ slightly but they all function the same.    Why do I need to use an incentive spirometer?  Using your incentive spirometer prepares your lungs for surgery and  helps prevent lung problems after surgery.  How do I use my incentive spirometer?  When you're using your incentive spirometer, make sure to breathe through your mouth. If you breathe through your nose, the incentive spirometer won't work properly. You can hold your nose if you have trouble.  If you feel dizzy at any time, stop and rest. Try again at a later time.  Follow the steps below:  Set up your incentive spirometer, expand the flexible tubing and connect to the outlet.  Sit upright in a chair or bed. Hold the incentive spirometer at eye level.   Put the mouthpiece in your mouth and close your lips tightly around it. Slowly breathe out (exhale) completely.  Breathe in (inhale) slowly through your mouth as deeply as you can. As you take a breath, you will see the piston rise inside the large column. While the piston rises, the indicator should move upwards. It should stay in between the 2 arrows (see Figure).  Try to get the piston as high as you can, while keeping the indicator between the arrows.   If the indicator doesn't stay between the arrows, you're breathing either too fast or too slow.  When you get it as high as you can, hold your breath for 10 seconds, or as long as possible. While you're holding your breath, the piston will slowly fall to the base of the spirometer.  Once the piston reaches the bottom of the spirometer, breathe out slowly through your mouth. Rest for a few seconds.  Repeat 10 times. Try to get the piston to the same level with each breath.  Repeat every hour while awake  You can carefully clean the outside of the mouthpiece with an alcohol wipe or soap and water.       Patient and Family Education             Ways You Can Help Prevent Blood Clots                    This handout explains some simple things you can do to help prevent blood clots.      Blood clots are blockages that can form in the body's veins. When a blood clot forms in your deep veins, it may be called a deep vein  thrombosis, or DVT for short. Blood clots can happen in any part of the body where blood flows, but they are most common in the arms and legs. If a piece of a blood clot breaks free and travels to the lungs, it is called a pulmonary embolus (PE). A PE can be a very serious problem.         Being in the hospital or having surgery can raise your chances of getting a blood clot because you may not be well enough to move around as much as you normally do.         Ways you can help prevent blood clots in the hospital           Wearing SCDs. SCDs stands for Sequential Compression Devices.   SCDs are special sleeves that wrap around your legs  They attach to a pump that fills them with air to gently squeeze your legs every few minutes.   This helps return the blood in your legs to your heart.   SCDs should only be taken off when walking or bathing.   SCDs may not be comfortable, but they can help save your life.                                            Wearing compression stockings - if your doctor orders them. These special snug fitting stockings gently squeeze your legs to help blood flow.       Walking. Walking helps move the blood in your legs.   If your doctor says it is ok, try walking the halls at least   5 times a day. Ask us to help you get up, so you don't fall.      Taking any blood thinning medicines your doctor orders.        Page 1 of 2            [facility]; 3/23   Ways you can help prevent blood clots at home         Wearing compression stockings - if your doctor orders them. ? Walking - to help move the blood in your legs.       Taking any blood thinning medicines your doctor orders.      Signs of a blood clot or PE        Tell your doctor or nurse know right away if you have of the problems listed below.    If you are at home, seek medical care right away. Call 911 for chest pain or problems breathing.                Signs of a blood clot (DVT) - such as pain,  swelling, redness or warmth in  your arm or leg      Signs of a pulmonary embolism (PE) - such as chest pain or feeling short of breath

## 2025-03-18 NOTE — H&P (VIEW-ONLY)
"CPM/PAT Evaluation       Name: Fifi Larsen (Fifi Larsen \"Carla\")  /Age: 1975/49 y.o.     In-Person       Chief Complaint: Closed torus fracture of distal end of right fibula    HPI  Patient is an alert and oriented 49 year old female scheduled for a  ORIF, FRACTURE, FIBULA on 3/19/25 with Dr. Zimmerman for  3/19/25. PMHX includes leg pain. Presents to [facility] today for perioperative risk stratification and optimization.     Past Medical History:   Diagnosis Date    Acute appendicitis     RLQ abdominal pain        Past Surgical History:   Procedure Laterality Date    BREAST LUMPECTOMY Left        Patient  has no history on file for sexual activity.    No family history on file.    No Known Allergies    Prior to Admission medications    Medication Sig Start Date End Date Taking? Authorizing Provider   BIOTIN ORAL Take by mouth.    Historical Provider, MD   multivitamin tablet Take 1 capsule by mouth once daily.    Historical Provider, MD       Review of Systems   Constitutional: Negative for chills, decreased appetite, diaphoresis, fever and malaise/fatigue.   Eyes:  Negative for blurred vision and double vision.   Cardiovascular:  Negative for chest pain, claudication, cyanosis, dyspnea on exertion, irregular heartbeat, leg swelling, near-syncope and palpitations.   Respiratory:  Negative for cough, hemoptysis, shortness of breath and wheezing.    Endocrine: Negative for cold intolerance, heat intolerance, polydipsia, polyphagia and polyuria.   Gastrointestinal:  Negative for abdominal pain, constipation, diarrhea, dysphagia, nausea and vomiting.   Genitourinary:  Negative for bladder incontinence, dysuria, hematuria, incomplete emptying, nocturia, frequency, pelvic pain and urgency.   Neurological:  Negative for headaches, light-headedness, paresthesias, sensory change and weakness.   Psychiatric/Behavioral:  Negative for altered mental status.    Musculoskeletal: Positive for myalgias, " arthralgias     Vitals and nursing note reviewed.     Physical exam  Constitutional:       Appearance: Normal appearance. She is Normal.   HENT:      Head: Normocephalic and atraumatic.      Mouth/Throat:      Mouth: Mucous membranes are moist.      Pharynx: Oropharynx is clear.   Eyes:      Extraocular Movements: Extraocular movements intact.      Conjunctiva/sclera: Conjunctivae normal.      Pupils: Pupils are equal, round, and reactive to light.   Cardiovascular:      PMI at left midclavicular line. Normal rate. Regular rhythm. Normal S1. Normal S2.       Murmurs: There is no murmur.      No gallop.  No click. No rub.       No audible carotid bruit     No lower extremity edema on exam  Pulmonary:      Effort: Pulmonary effort is normal.      Breath sounds: Normal breath sounds.   Abdominal:      General: Abdomen is flat. Bowel sounds are normal.      Palpations: Abdomen is soft and non-tender  Musculoskeletal:      Cervical back: Normal range of motion and neck supple.   Skin:     General: Skin is warm and dry.      Capillary Refill: Capillary refill takes less than 2 seconds.   Neurological:      General: No focal deficit present.      Mental Status: She is alert and oriented to person, place, and time. Mental status is at baseline.   Psychiatric:         Mood and Affect: Mood normal.         Behavior: Behavior normal.         Thought Content: Thought content normal.         Judgment: Judgment normal.     Vitals and nursing note reviewed. Physical exam within normal limits.       DASI Risk Score      Flowsheet Row Pre-Admission Testing from 3/18/2025 in [facility]   Can you take care of yourself (eat, dress, bathe, or use toilet)?  2.75 filed at 03/18/2025 1643   Can you walk indoors, such as around your house? 1.75 filed at 03/18/2025 1643   Can you walk a block or two on level ground?  2.75 filed at 03/18/2025 1643   Can you climb a flight of stairs or walk up a hill? 5.5 filed at 03/18/2025  1643   Can you run a short distance? 8 filed at 03/18/2025 1643   Can you do light work around the house like dusting or washing dishes? 2.7 filed at 03/18/2025 1643   Can you do moderate work around the house like vacuuming, sweeping floors or carrying groceries? 3.5 filed at 03/18/2025 1643   Can you do heavy work around the house like scrubbing floors or lifting and moving heavy furniture?  8 filed at 03/18/2025 1643   Can you do yard work like raking leaves, weeding or pushing a mower? 4.5 filed at 03/18/2025 1643   Can you have sexual relations? 5.25 filed at 03/18/2025 1643   Can you participate in moderate recreational activities like golf, bowling, dancing, doubles tennis or throwing a baseball or football? 6 filed at 03/18/2025 1643   Can you participate in strenous sports like swimming, singles tennis, football, basketball, or skiing? 7.5 filed at 03/18/2025 1643   DASI SCORE 58.2 filed at 03/18/2025 1643   METS Score (Will be calculated only when all the questions are answered) 9.9 filed at 03/18/2025 1643          Caprini DVT Assessment      Flowsheet Row Pre-Admission Testing from 3/18/2025 in [facility] ED to Hosp-Admission (Discharged) from 10/21/2024 in [facility] Bldg A 1 with Jayme Ireland MD, Tracey Castaneda MD and Leisa Weeks MD   DVT Score (IF A SCORE IS NOT CALCULATING, MUST SELECT A BMI TO COMPLETE) 4 filed at 03/18/2025 1640 4 filed at 10/21/2024 2211   Surgical Factors Major surgery planned, including arthroscopic and laproscopic (1-2 hours) filed at 03/18/2025 1640 Major surgery planned, including arthroscopic and laproscopic (1-2 hours) filed at 10/21/2024 2211   BMI (BMI MUST BE CHOSEN) 30 or less filed at 03/18/2025 1640 30 or less filed at 10/21/2024 2211          Modified Frailty Index    No data to display       [id number] Stroke Risk Points  Current as of just now        N/A 0 to 9 Points:      Last Change: N/A          The  [id number] risk score (Pino WILLS et al. 2009. © 2010 American [employer]) quantifies the risk of stroke for a patient with atrial fibrillation. For patients without atrial fibrillation or under the age of 18 this score appears as N/A. Higher score values generally indicate higher risk of stroke.        This score is not applicable to this patient. Components are not calculated.          Revised Cardiac Risk Index      Flowsheet Row Pre-Admission Testing from 3/18/2025 in [facility]   High-Risk Surgery (Intraperitoneal, Intrathoracic,Suprainguinal vascular) 0 filed at 03/18/2025 1644   History of ischemic heart disease (History of MI, History of positive exercuse test, Current chest paint considered due to myocardial ischemia, Use of nitrate therapy, ECG with pathological Q Waves) 0 filed at 03/18/2025 1644   History of congestive heart failure (pulmonary edemia, bilateral rales or S3 gallop, Paroxysmal nocturnal dyspnea, CXR showing pulmonary vascular redistribution) 0 filed at 03/18/2025 1644   History of cerebrovascular disease (Prior TIA or stroke) 0 filed at 03/18/2025 1644   Pre-operative insulin treatment 0 filed at 03/18/2025 1644   Pre-operative creatinine>2 mg/dl 0 filed at 03/18/2025 1644   Revised Cardiac Risk Calculator 0 filed at 03/18/2025 1644          Apfel Simplified Score    No data to display       Risk Analysis Index Results This Encounter    No data found in the last 10 encounters.       Stop Bang Score      Flowsheet Row Pre-Admission Testing from 3/18/2025 in [facility]   Do you snore loudly? 0 filed at 03/18/2025 1603   Do you often feel tired or fatigued after your sleep? 0 filed at 03/18/2025 1603   Has anyone ever observed you stop breathing in your sleep? 0 filed at 03/18/2025 1603   Do you have or are you being treated for high blood pressure? 0 filed at 03/18/2025 1603   Recent BMI (Calculated) 24.9 filed at 03/18/2025 1603   Is BMI  greater than 35 kg/m2? 0=No filed at 03/18/2025 1603   Age older than 50 years old? 0=No filed at 03/18/2025 1603   Is your neck circumference greater than 17 inches (Male) or 16 inches (Female)? 0 filed at 03/18/2025 1603   Gender - Male 0=No filed at 03/18/2025 1603   STOP-BANG Total Score 0 filed at 03/18/2025 1603          Prodigy: High Risk  Total Score: 0          ARISCAT Score for Postoperative Pulmonary Complications    No data to display       Lisa Perioperative Risk for Myocardial Infarction or Cardiac Arrest (JOSE LUIS)      Flowsheet Row Pre-Admission Testing from 3/18/2025 in [facility]   Calculated Age Score 1 filed at 03/18/2025 1645   Functional Status  0 filed at 03/18/2025 1645   ASA Class  -5.17 filed at 03/18/2025 1645   Creatinine 0 filed at 03/18/2025 1645   Type of Procedure  0.80 filed at 03/18/2025 1645   JOSE LUIS Total Score  -8.62 filed at 03/18/2025 1645   JOSE LUIS % 0.02 filed at 03/18/2025 1645            Assessment & Plan:    Neuro:  No diagnosis or significant findings on chart review or clinical presentation and evaluation.     HEENT/Airway:  No diagnosis or significant findings on chart review or clinical presentation and evaluation.   STOP-BANG Score- 1 points low risk for ERIKA    Mallampati::  II    TM distance::  >3 FB    Neck ROM::  Full  Dentures-denies  Crowns-reports x 2  Implants-denies    Cardiovascular:  No diagnosis or significant findings on chart review or clinical presentation and evaluation.   METS: 9.9  RCRI: 0 points, 3.9%  risk for postoperative MACE   JOSE LUIS: 0.02% risk for postoperative MACE  EKG -not indicated, no HTN, no cardiac history     Pulmonary:  No diagnosis or significant findings on chart review or clinical presentation and evaluation.   ARISCAT: <26 points, 1.6% risk of in-hospital postoperative pulmonary complication  PRODIGY: Low risk for opioid induced respiratory depression  Smoking History-She has never smoked.  Discussed smoking cessation and  deep breathing handout given    Renal/Urinary:  No diagnosis or significant findings on chart review or clinical presentation and evaluation. Preventative measures include BP monitoring, medication compliance, and hydration management.   CMP-Pending  Creatinine-  GFR-    Endocrine:  No diagnosis or significant findings on chart review or clinical presentation and evaluation.   [id number]    Hematologic/Immunology:  Breast clip/marker   The patient is not a Jehovah’s witness and will accept blood and blood products if medically indicated.   History of previous blood transfusions No  CBC-Pending  HGB-Pending  Caprini Score 4, patient at Low for postoperative DVT. Pt supplied education/VTE handout  Anticoagulation use: No     Gastrointestinal:   S/p appendectomy 10/22/24  Recreational drug use: none  Alcohol use social drinker    Infectious disease:   No diagnosis or significant findings on chart review or clinical presentation and evaluation.     Musculoskeletal:   Closed torus fracture of distal end of right fibula  JHFRAT score- 5 points. low risk for falls    Anesthesia:  ASA 2 - Patient with mild systemic disease with no functional limitations  Anticipated anesthesia-general  History of General anesthesia- yes  Complications- No anesthesia complications  No family history of anesthesia complications    Labs & Imaging ordered:  CBC, CMP  Nickel/metal allergy-negative  Shellfish allergy-negative    Discussed with patient medication instructions, NPO guidelines, and any questions or concerns.      time spent  35

## 2025-03-19 ENCOUNTER — ANESTHESIA (OUTPATIENT)
Dept: OPERATING ROOM | Facility: HOSPITAL | Age: 50
End: 2025-03-19
Payer: COMMERCIAL

## 2025-03-19 ENCOUNTER — ANESTHESIA EVENT (OUTPATIENT)
Dept: OPERATING ROOM | Facility: HOSPITAL | Age: 50
End: 2025-03-19
Payer: COMMERCIAL

## 2025-03-19 ENCOUNTER — APPOINTMENT (OUTPATIENT)
Dept: RADIOLOGY | Facility: HOSPITAL | Age: 50
End: 2025-03-19
Payer: COMMERCIAL

## 2025-03-19 ENCOUNTER — HOSPITAL ENCOUNTER (OUTPATIENT)
Facility: HOSPITAL | Age: 50
Setting detail: OUTPATIENT SURGERY
Discharge: HOME | End: 2025-03-19
Attending: ORTHOPAEDIC SURGERY | Admitting: ORTHOPAEDIC SURGERY
Payer: COMMERCIAL

## 2025-03-19 VITALS
HEART RATE: 45 BPM | TEMPERATURE: 97.9 F | BODY MASS INDEX: 25.41 KG/M2 | WEIGHT: 148.81 LBS | HEIGHT: 64 IN | RESPIRATION RATE: 16 BRPM | SYSTOLIC BLOOD PRESSURE: 137 MMHG | OXYGEN SATURATION: 100 % | DIASTOLIC BLOOD PRESSURE: 84 MMHG

## 2025-03-19 DIAGNOSIS — S82.821A CLOSED TORUS FRACTURE OF DISTAL END OF RIGHT FIBULA, INITIAL ENCOUNTER: Primary | ICD-10-CM

## 2025-03-19 DIAGNOSIS — S82.841A CLOSED BIMALLEOLAR FRACTURE OF RIGHT ANKLE, INITIAL ENCOUNTER: ICD-10-CM

## 2025-03-19 DIAGNOSIS — G89.18 POST-OPERATIVE PAIN: ICD-10-CM

## 2025-03-19 LAB — HCG UR QL IA.RAPID: NEGATIVE

## 2025-03-19 PROCEDURE — 64447 NJX AA&/STRD FEMORAL NRV IMG: CPT | Performed by: ANESTHESIOLOGY

## 2025-03-19 PROCEDURE — 3700000002 HC GENERAL ANESTHESIA TIME - EACH INCREMENTAL 1 MINUTE: Performed by: ORTHOPAEDIC SURGERY

## 2025-03-19 PROCEDURE — 27698 REPAIR OF ANKLE LIGAMENT: CPT | Performed by: ORTHOPAEDIC SURGERY

## 2025-03-19 PROCEDURE — 7100000009 HC PHASE TWO TIME - INITIAL BASE CHARGE: Performed by: ORTHOPAEDIC SURGERY

## 2025-03-19 PROCEDURE — C1713 ANCHOR/SCREW BN/BN,TIS/BN: HCPCS | Performed by: ORTHOPAEDIC SURGERY

## 2025-03-19 PROCEDURE — 27814 TREATMENT OF ANKLE FRACTURE: CPT | Performed by: ORTHOPAEDIC SURGERY

## 2025-03-19 PROCEDURE — 2500000004 HC RX 250 GENERAL PHARMACY W/ HCPCS (ALT 636 FOR OP/ED)

## 2025-03-19 PROCEDURE — 7100000002 HC RECOVERY ROOM TIME - EACH INCREMENTAL 1 MINUTE: Performed by: ORTHOPAEDIC SURGERY

## 2025-03-19 PROCEDURE — 2500000005 HC RX 250 GENERAL PHARMACY W/O HCPCS: Performed by: ORTHOPAEDIC SURGERY

## 2025-03-19 PROCEDURE — 3700000001 HC GENERAL ANESTHESIA TIME - INITIAL BASE CHARGE: Performed by: ORTHOPAEDIC SURGERY

## 2025-03-19 PROCEDURE — 7100000001 HC RECOVERY ROOM TIME - INITIAL BASE CHARGE: Performed by: ORTHOPAEDIC SURGERY

## 2025-03-19 PROCEDURE — 3600000004 HC OR TIME - INITIAL BASE CHARGE - PROCEDURE LEVEL FOUR: Performed by: ORTHOPAEDIC SURGERY

## 2025-03-19 PROCEDURE — 2780000003 HC OR 278 NO HCPCS: Performed by: ORTHOPAEDIC SURGERY

## 2025-03-19 PROCEDURE — 81025 URINE PREGNANCY TEST: CPT | Performed by: ORTHOPAEDIC SURGERY

## 2025-03-19 PROCEDURE — 7100000010 HC PHASE TWO TIME - EACH INCREMENTAL 1 MINUTE: Performed by: ORTHOPAEDIC SURGERY

## 2025-03-19 PROCEDURE — 2500000004 HC RX 250 GENERAL PHARMACY W/ HCPCS (ALT 636 FOR OP/ED): Performed by: ORTHOPAEDIC SURGERY

## 2025-03-19 PROCEDURE — A27792 PR OPEN TX DISTAL FIBULAR FRACTURE LAT MALLEOLUS: Performed by: ANESTHESIOLOGY

## 2025-03-19 PROCEDURE — 2500000004 HC RX 250 GENERAL PHARMACY W/ HCPCS (ALT 636 FOR OP/ED): Performed by: ANESTHESIOLOGY

## 2025-03-19 PROCEDURE — 2500000004 HC RX 250 GENERAL PHARMACY W/ HCPCS (ALT 636 FOR OP/ED): Performed by: NURSE ANESTHETIST, CERTIFIED REGISTERED

## 2025-03-19 PROCEDURE — A27792 PR OPEN TX DISTAL FIBULAR FRACTURE LAT MALLEOLUS: Performed by: NURSE ANESTHETIST, CERTIFIED REGISTERED

## 2025-03-19 PROCEDURE — 3600000009 HC OR TIME - EACH INCREMENTAL 1 MINUTE - PROCEDURE LEVEL FOUR: Performed by: ORTHOPAEDIC SURGERY

## 2025-03-19 PROCEDURE — 64445 NJX AA&/STRD SCIATIC NRV IMG: CPT | Performed by: ANESTHESIOLOGY

## 2025-03-19 PROCEDURE — 77071 MNL APPL STRS JT RADIOGRAPHY: CPT | Performed by: ORTHOPAEDIC SURGERY

## 2025-03-19 PROCEDURE — 2720000007 HC OR 272 NO HCPCS: Performed by: ORTHOPAEDIC SURGERY

## 2025-03-19 DEVICE — SCREW, LOW PROFILE, CORTICAL, 3.5 X 14MM, SS: Type: IMPLANTABLE DEVICE | Site: ANKLE | Status: FUNCTIONAL

## 2025-03-19 DEVICE — IMPLANTABLE DEVICE: Type: IMPLANTABLE DEVICE | Site: ANKLE | Status: FUNCTIONAL

## 2025-03-19 DEVICE — SCREW, LOW PROFILE, CORTICAL, 3.5 X 16M, SS: Type: IMPLANTABLE DEVICE | Site: ANKLE | Status: FUNCTIONAL

## 2025-03-19 DEVICE — DX FIBERTAK SUTURE ANCHOR, ST & NDLS
Type: IMPLANTABLE DEVICE | Site: ANKLE | Status: FUNCTIONAL
Brand: ARTHREX®

## 2025-03-19 DEVICE — SCREW, LOW PROFILE, CORTICAL, 3.5 X 12MM. SS: Type: IMPLANTABLE DEVICE | Site: ANKLE | Status: FUNCTIONAL

## 2025-03-19 RX ORDER — CEFAZOLIN SODIUM 2 G/100ML
2 INJECTION, SOLUTION INTRAVENOUS ONCE
Status: COMPLETED | OUTPATIENT
Start: 2025-03-19 | End: 2025-03-19

## 2025-03-19 RX ORDER — METOCLOPRAMIDE 10 MG/1
10 TABLET ORAL ONCE
Status: DISCONTINUED | OUTPATIENT
Start: 2025-03-19 | End: 2025-03-19 | Stop reason: HOSPADM

## 2025-03-19 RX ORDER — ACETAMINOPHEN 325 MG/1
325 TABLET ORAL EVERY 6 HOURS PRN
COMMUNITY
End: 2025-03-19 | Stop reason: HOSPADM

## 2025-03-19 RX ORDER — FENTANYL CITRATE 50 UG/ML
INJECTION, SOLUTION INTRAMUSCULAR; INTRAVENOUS AS NEEDED
Status: DISCONTINUED | OUTPATIENT
Start: 2025-03-19 | End: 2025-03-19

## 2025-03-19 RX ORDER — HYDROCODONE BITARTRATE AND ACETAMINOPHEN 5; 325 MG/1; MG/1
1 TABLET ORAL EVERY 4 HOURS PRN
Qty: 42 TABLET | Refills: 0 | Status: SHIPPED | OUTPATIENT
Start: 2025-03-19 | End: 2025-03-26

## 2025-03-19 RX ORDER — GLYCOPYRROLATE 0.2 MG/ML
0.2 INJECTION INTRAMUSCULAR; INTRAVENOUS ONCE
Status: DISCONTINUED | OUTPATIENT
Start: 2025-03-19 | End: 2025-03-19 | Stop reason: HOSPADM

## 2025-03-19 RX ORDER — LIDOCAINE HYDROCHLORIDE 10 MG/ML
0.1 INJECTION, SOLUTION EPIDURAL; INFILTRATION; INTRACAUDAL; PERINEURAL ONCE
Status: DISCONTINUED | OUTPATIENT
Start: 2025-03-19 | End: 2025-03-19 | Stop reason: HOSPADM

## 2025-03-19 RX ORDER — HYDROMORPHONE HYDROCHLORIDE 0.2 MG/ML
0.1 INJECTION INTRAMUSCULAR; INTRAVENOUS; SUBCUTANEOUS EVERY 5 MIN PRN
Status: DISCONTINUED | OUTPATIENT
Start: 2025-03-19 | End: 2025-03-19 | Stop reason: HOSPADM

## 2025-03-19 RX ORDER — ONDANSETRON HYDROCHLORIDE 2 MG/ML
INJECTION, SOLUTION INTRAVENOUS AS NEEDED
Status: DISCONTINUED | OUTPATIENT
Start: 2025-03-19 | End: 2025-03-19

## 2025-03-19 RX ORDER — ASPIRIN 325 MG
325 TABLET, DELAYED RELEASE (ENTERIC COATED) ORAL 2 TIMES DAILY
Qty: 84 TABLET | Refills: 0 | Status: SHIPPED | OUTPATIENT
Start: 2025-03-19 | End: 2025-04-30

## 2025-03-19 RX ORDER — KETOROLAC TROMETHAMINE 30 MG/ML
INJECTION, SOLUTION INTRAMUSCULAR; INTRAVENOUS AS NEEDED
Status: DISCONTINUED | OUTPATIENT
Start: 2025-03-19 | End: 2025-03-19

## 2025-03-19 RX ORDER — HYDRALAZINE HYDROCHLORIDE 20 MG/ML
10 INJECTION INTRAMUSCULAR; INTRAVENOUS EVERY 30 MIN PRN
Status: DISCONTINUED | OUTPATIENT
Start: 2025-03-19 | End: 2025-03-19 | Stop reason: HOSPADM

## 2025-03-19 RX ORDER — KETOROLAC TROMETHAMINE 15 MG/ML
15 INJECTION, SOLUTION INTRAMUSCULAR; INTRAVENOUS ONCE AS NEEDED
Status: DISCONTINUED | OUTPATIENT
Start: 2025-03-19 | End: 2025-03-19 | Stop reason: HOSPADM

## 2025-03-19 RX ORDER — ONDANSETRON 8 MG/1
8 TABLET, ORALLY DISINTEGRATING ORAL EVERY 8 HOURS PRN
Qty: 30 TABLET | Refills: 0 | Status: SHIPPED | OUTPATIENT
Start: 2025-03-19

## 2025-03-19 RX ORDER — NORETHINDRONE AND ETHINYL ESTRADIOL 0.5-0.035
50 KIT ORAL ONCE
Status: DISCONTINUED | OUTPATIENT
Start: 2025-03-19 | End: 2025-03-19 | Stop reason: HOSPADM

## 2025-03-19 RX ORDER — ONDANSETRON HYDROCHLORIDE 2 MG/ML
4 INJECTION, SOLUTION INTRAVENOUS ONCE AS NEEDED
Status: DISCONTINUED | OUTPATIENT
Start: 2025-03-19 | End: 2025-03-19 | Stop reason: HOSPADM

## 2025-03-19 RX ORDER — ONDANSETRON HYDROCHLORIDE 2 MG/ML
4 INJECTION, SOLUTION INTRAVENOUS EVERY 8 HOURS PRN
Status: DISCONTINUED | OUTPATIENT
Start: 2025-03-19 | End: 2025-03-19 | Stop reason: HOSPADM

## 2025-03-19 RX ORDER — FAMOTIDINE 20 MG/1
20 TABLET, FILM COATED ORAL ONCE
Status: DISCONTINUED | OUTPATIENT
Start: 2025-03-19 | End: 2025-03-19 | Stop reason: HOSPADM

## 2025-03-19 RX ORDER — FENTANYL CITRATE 50 UG/ML
50 INJECTION, SOLUTION INTRAMUSCULAR; INTRAVENOUS ONCE
Status: COMPLETED | OUTPATIENT
Start: 2025-03-19 | End: 2025-03-19

## 2025-03-19 RX ORDER — PROPOFOL 10 MG/ML
INJECTION, EMULSION INTRAVENOUS AS NEEDED
Status: DISCONTINUED | OUTPATIENT
Start: 2025-03-19 | End: 2025-03-19

## 2025-03-19 RX ORDER — DOCUSATE SODIUM 100 MG/1
200 CAPSULE, LIQUID FILLED ORAL 2 TIMES DAILY PRN
Qty: 60 CAPSULE | Refills: 0 | Status: SHIPPED | OUTPATIENT
Start: 2025-03-19 | End: 2025-04-18

## 2025-03-19 RX ORDER — MIDAZOLAM HYDROCHLORIDE 1 MG/ML
1 INJECTION, SOLUTION INTRAMUSCULAR; INTRAVENOUS ONCE AS NEEDED
Status: DISCONTINUED | OUTPATIENT
Start: 2025-03-19 | End: 2025-03-19 | Stop reason: HOSPADM

## 2025-03-19 RX ORDER — LABETALOL HYDROCHLORIDE 5 MG/ML
10 INJECTION, SOLUTION INTRAVENOUS ONCE AS NEEDED
Status: DISCONTINUED | OUTPATIENT
Start: 2025-03-19 | End: 2025-03-19 | Stop reason: HOSPADM

## 2025-03-19 RX ORDER — DIPHENHYDRAMINE HYDROCHLORIDE 50 MG/ML
12.5 INJECTION, SOLUTION INTRAMUSCULAR; INTRAVENOUS ONCE AS NEEDED
Status: DISCONTINUED | OUTPATIENT
Start: 2025-03-19 | End: 2025-03-19 | Stop reason: HOSPADM

## 2025-03-19 RX ORDER — ALBUTEROL SULFATE 0.83 MG/ML
2.5 SOLUTION RESPIRATORY (INHALATION) ONCE AS NEEDED
Status: DISCONTINUED | OUTPATIENT
Start: 2025-03-19 | End: 2025-03-19 | Stop reason: HOSPADM

## 2025-03-19 RX ORDER — OXYCODONE HYDROCHLORIDE 5 MG/1
5 TABLET ORAL ONCE AS NEEDED
Status: DISCONTINUED | OUTPATIENT
Start: 2025-03-19 | End: 2025-03-19 | Stop reason: HOSPADM

## 2025-03-19 RX ORDER — ALBUTEROL SULFATE 0.83 MG/ML
2.5 SOLUTION RESPIRATORY (INHALATION) ONCE
Status: DISCONTINUED | OUTPATIENT
Start: 2025-03-19 | End: 2025-03-19 | Stop reason: HOSPADM

## 2025-03-19 RX ORDER — ONDANSETRON 4 MG/1
4 TABLET, ORALLY DISINTEGRATING ORAL ONCE AS NEEDED
Status: DISCONTINUED | OUTPATIENT
Start: 2025-03-19 | End: 2025-03-19 | Stop reason: HOSPADM

## 2025-03-19 RX ORDER — LIDOCAINE HYDROCHLORIDE 10 MG/ML
INJECTION, SOLUTION EPIDURAL; INFILTRATION; INTRACAUDAL; PERINEURAL AS NEEDED
Status: DISCONTINUED | OUTPATIENT
Start: 2025-03-19 | End: 2025-03-19

## 2025-03-19 RX ORDER — MIDAZOLAM HYDROCHLORIDE 1 MG/ML
2 INJECTION, SOLUTION INTRAMUSCULAR; INTRAVENOUS ONCE
Status: COMPLETED | OUTPATIENT
Start: 2025-03-19 | End: 2025-03-19

## 2025-03-19 RX ORDER — MEPERIDINE HYDROCHLORIDE 25 MG/ML
12.5 INJECTION INTRAMUSCULAR; INTRAVENOUS; SUBCUTANEOUS EVERY 10 MIN PRN
Status: DISCONTINUED | OUTPATIENT
Start: 2025-03-19 | End: 2025-03-19 | Stop reason: HOSPADM

## 2025-03-19 RX ORDER — SODIUM CHLORIDE, SODIUM LACTATE, POTASSIUM CHLORIDE, CALCIUM CHLORIDE 600; 310; 30; 20 MG/100ML; MG/100ML; MG/100ML; MG/100ML
INJECTION, SOLUTION INTRAVENOUS CONTINUOUS PRN
Status: DISCONTINUED | OUTPATIENT
Start: 2025-03-19 | End: 2025-03-19

## 2025-03-19 RX ADMIN — LIDOCAINE HYDROCHLORIDE 5 ML: 10 INJECTION, SOLUTION EPIDURAL; INFILTRATION; INTRACAUDAL; PERINEURAL at 11:38

## 2025-03-19 RX ADMIN — PROPOFOL 300 MG: 10 INJECTION, EMULSION INTRAVENOUS at 11:41

## 2025-03-19 RX ADMIN — MIDAZOLAM 2 MG: 1 INJECTION INTRAMUSCULAR; INTRAVENOUS at 10:52

## 2025-03-19 RX ADMIN — FENTANYL CITRATE 50 MCG: 0.05 INJECTION, SOLUTION INTRAMUSCULAR; INTRAVENOUS at 10:53

## 2025-03-19 RX ADMIN — FENTANYL CITRATE 25 MCG: 50 INJECTION INTRAMUSCULAR; INTRAVENOUS at 13:19

## 2025-03-19 RX ADMIN — KETOROLAC TROMETHAMINE 30 MG: 30 INJECTION, SOLUTION INTRAMUSCULAR at 12:58

## 2025-03-19 RX ADMIN — PROPOFOL 200 MG: 10 INJECTION, EMULSION INTRAVENOUS at 11:38

## 2025-03-19 RX ADMIN — DEXAMETHASONE SODIUM PHOSPHATE 4 MG: 4 INJECTION, SOLUTION INTRAMUSCULAR; INTRAVENOUS at 11:49

## 2025-03-19 RX ADMIN — CEFAZOLIN SODIUM 2 G: 2 INJECTION, SOLUTION INTRAVENOUS at 11:35

## 2025-03-19 RX ADMIN — ONDANSETRON 4 MG: 2 INJECTION, SOLUTION INTRAMUSCULAR; INTRAVENOUS at 12:58

## 2025-03-19 RX ADMIN — POVIDONE-IODINE 1 APPLICATION: 5 SOLUTION TOPICAL at 10:45

## 2025-03-19 RX ADMIN — SODIUM CHLORIDE, POTASSIUM CHLORIDE, SODIUM LACTATE AND CALCIUM CHLORIDE: 600; 310; 30; 20 INJECTION, SOLUTION INTRAVENOUS at 11:35

## 2025-03-19 RX ADMIN — FENTANYL CITRATE 25 MCG: 50 INJECTION INTRAMUSCULAR; INTRAVENOUS at 12:57

## 2025-03-19 RX ADMIN — FENTANYL CITRATE 50 MCG: 50 INJECTION INTRAMUSCULAR; INTRAVENOUS at 11:37

## 2025-03-19 SDOH — HEALTH STABILITY: MENTAL HEALTH: CURRENT SMOKER: 0

## 2025-03-19 ASSESSMENT — PAIN SCALES - GENERAL
PAINLEVEL_OUTOF10: 0 - NO PAIN
PAIN_LEVEL: 2
PAINLEVEL_OUTOF10: 0 - NO PAIN
PAINLEVEL_OUTOF10: 0 - NO PAIN

## 2025-03-19 NOTE — PERIOPERATIVE NURSING NOTE
Patient in Phase 2; dressed and up to chair with RN assist. Tolerating po fluids, no complaint of pain and no complaint of nausea.     Significant other at bedside; discussed discharge instructions with patient and Significant other. All questions at this time answered and family verbalizes understanding of discharge instructions.     Patient clinically appropriate for discharge. IV removed and patient transported to discharge area via wheelchair.

## 2025-03-19 NOTE — ANESTHESIA PROCEDURE NOTES
Peripheral Block    Patient location during procedure: pre-op  Start time: 3/19/2025 11:00 AM  End time: 3/19/2025 11:01 AM  Reason for block: at surgeon's request and post-op pain management  Staffing  Performed: attending   Authorized by: Mikhail Barber MD    Performed by: Mikhail Barber MD  Preanesthetic Checklist  Completed: patient identified, IV checked, site marked, risks and benefits discussed, surgical consent, monitors and equipment checked, pre-op evaluation and timeout performed   Timeout performed at: 3/19/2025 10:50 AM  Peripheral Block  Patient position: laying flat  Prep: ChloraPrep  Patient monitoring: heart rate, cardiac monitor and continuous pulse ox  Block type: adductor canal  Laterality: right  Injection technique: single-shot  Guidance: ultrasound guided  Local infiltration: ropivacaine  Infiltration strength: 0.5 %  Dose: 10 mL  Needle  Needle type: short-bevel   Needle gauge: 21 G  Needle length: 10 cm  Needle localization: ultrasound guidance  Assessment  Injection assessment: negative aspiration for heme, no paresthesia on injection, incremental injection and local visualized surrounding nerve on ultrasound  Paresthesia pain: none  Heart rate change: no  Slow fractionated injection: yes

## 2025-03-19 NOTE — ANESTHESIA PROCEDURE NOTES
Peripheral Block    Patient location during procedure: pre-op  Start time: 3/19/2025 10:55 AM  End time: 3/19/2025 10:58 AM  Reason for block: at surgeon's request and post-op pain management  Staffing  Performed: attending   Authorized by: Mikhail Barber MD    Performed by: Mikhail Barber MD  Preanesthetic Checklist  Completed: patient identified, IV checked, site marked, risks and benefits discussed, surgical consent, monitors and equipment checked, pre-op evaluation and timeout performed   Timeout performed at: 3/19/2025 10:50 AM  Peripheral Block  Patient position: laying flat  Prep: ChloraPrep  Patient monitoring: heart rate, cardiac monitor and continuous pulse ox  Block type: popliteal  Laterality: right  Injection technique: single-shot  Guidance: ultrasound guided  Local infiltration: ropivacaine  Infiltration strength: 0.5 %  Dose: 20 mL  Needle  Needle type: Tuohy   Needle gauge: 21 G  Needle length: 10 cm  Needle localization: ultrasound guidance  Assessment  Injection assessment: negative aspiration for heme, no paresthesia on injection, incremental injection and local visualized surrounding nerve on ultrasound  Paresthesia pain: none  Heart rate change: no  Slow fractionated injection: yes

## 2025-03-19 NOTE — ANESTHESIA PREPROCEDURE EVALUATION
"Patient: Fifi Larsen \"Carla\"    Procedure Information       Date/Time: 03/19/25 1110    Procedure: ORIF, FRACTURE, FIBULA (arthrex fibula fracture, tight rope, fibertak) (Right: Ankle)    Location: [facility] / [facility]    Surgeons: Clei Thao MD            Relevant Problems   No relevant active problems       Clinical information reviewed:     Meds               NPO Detail:  No data recorded     Physical Exam    Airway  Mallampati: II  TM distance: >3 FB     Cardiovascular   Rhythm: regular  Rate: normal     Dental - normal exam     Pulmonary   Breath sounds clear to auscultation     Abdominal   Abdomen: soft             Anesthesia Plan    History of general anesthesia?: unknown/emergency  History of complications of general anesthesia?: no    ASA 3     general and other   There is reasoning for not using neuraxial anesthesia or a peripheral nerve block.  (Labs acceptable, EKG not found in emr, GETA without diff.)  The patient is not a current smoker.  Education provided regarding risk of obstructive sleep apnea. (in appropriate circumstances)  intravenous induction   Anesthetic plan and risks discussed with patient.    Plan discussed with CRNA, CAA and attending.      "

## 2025-03-19 NOTE — ANESTHESIA PROCEDURE NOTES
Airway  Date/Time: 3/19/2025 11:40 AM  Urgency: elective    Airway not difficult    Staffing  Performed: CRNA   Authorized by: Geoffrey Hair DO    Performed by: VICKIE Sanderson  Patient location during procedure: OR    Indications and Patient Condition  Indications for airway management: anesthesia  Spontaneous ventilation: present  Sedation level: deep  Preoxygenated: yes  Patient position: sniffing  MILS maintained throughout  Mask difficulty assessment: 1 - vent by mask    Final Airway Details  Final airway type: supraglottic airway      Successful airway: Size 4     Number of attempts at approach: 1

## 2025-03-19 NOTE — DISCHARGE INSTRUCTIONS
"At-Home Instructions - Dr. Thao    Date: 3/19/2025   Surgery: Right ankle open reduction, internal fixation      Cast/Splint/Dressing Care Instructions  1) Keep cast/dressing clean and dry.  2) May bathe - but cast/dressing must remain dry. You may purchase a \"cast cover\" online or at most outpatient pharmacies like [facility] or [facility].   3) Should the cast become wet, you need to call your physician's clinic immediately for cast removal and replacement.  Moisture can cause skin breakdown and lead to infection if left untreated.  4) Do not stick any sharp object down the cast in order to itch, as this can cause scrapes/cuts/punctures which can lead to infection.  5) Observe for increasing pain in the extremity with the cast, finger/toe-tips turning blue/purple, or numbness and tingling in your toes/fingers.  Should any of these symptoms arise, you need to be seen immediately for evaluation of swelling and increasing compartment pressures within your affected extremity.  6) Keep your surgical extremity elevated - \"Toes Above your Nose\"  - This is key in the first two weeks after surgery to minimize swelling.  7) You may ice your extremity, being careful to prevent melting ice from saturating into the splint/cast. The best place to apply ice if you're in a splint is behind the knee.     Activity    You must remain non-weight bearing on your operative (Right leg) extremity.  Use crutches or a walker for ambulation.  No driving while on narcotic pain medication.    Do not get your dressing/cast/splint wet!    Discharge Pain Medications  You will be given a prescription for pain medication. You should start taking this the same day after your surgery.  Wean off as tolerated.  Do not wait to take the pain medication until the pain is severe, as it will be difficult to \"catch up\" once this occurs.      The pain medication usually reaches its full effect ~1 hour after ingesting.     If you have been sent home on " Colace, this medication should be taken until you are off all narcotic (i.e. Vicodin, Percocet, Oxycodone, etc) pain medications, in order to prevent constipation.  Senna and MiraLax are other over-the-counter medications that you can take to help with post-operative constipation which is usually related to the prescription pain medication. Make sure you drink plenty of water and eat a healthy diet.     Percocet or Norco have Tylenol in their ingredient lists.  You must be careful not to exceed 3,000mg (3 grams) of Tylenol, from all sources, within a single 24-hr period.      Some common side effects of the narcotic pain medications (Percocet, Oxycodone, Norco, etc) include nausea and itching.  Benadryl is a great over the counter medication that helps calm your stomach, decreases your anxiety levels, and minimizes the itching.  You can easily purchase this at your local pharmacy as an over-the-counter medication.  Please abide by the instructions as printed on the bottle.  If your nausea persists, make sure to take small amounts of crackers or other lighter foods.    Follow-Up/Emergency Contacts  Follow-up with Dr. Celi Thao's office, as scheduled.    Please call [phone] for an appointment if one has not been scheduled. Follow up 1 week after surgery.    If you have any concerns, please call [phone].    Blood Clot Prophylaxis  You will need to take ASA (Aspirin) 325 mg twice daily for 6 weeks after surgery. You will be provided a prescription, but can also purchase this over the counter.

## 2025-03-19 NOTE — ANESTHESIA POSTPROCEDURE EVALUATION
"Patient: Fifi Larsen \"Carla\"    Procedure Summary       Date: 03/19/25 Room / Location: [facility] / [facility]    Anesthesia Start: 1135 Anesthesia Stop: 1323    Procedure: ORIF, FRACTURE, FIBULA (arthrex fibula fracture, tight rope, fibertak) (Right: Ankle) Diagnosis:       Closed torus fracture of distal end of right fibula, initial encounter      (Closed torus fracture of distal end of right fibula, initial encounter [S82.821M])    Surgeons: Celi Thao MD Responsible Provider: Geoffrey Hair DO    Anesthesia Type: general, other ASA Status: 3            Anesthesia Type: general, other  /87   Pulse 51   Temp 36.8 °C (98.2 °F) (Temporal)   Resp 14   Ht 1.626 m (5' 4.02\")   Wt 67.5 kg (148 lb 13 oz)   SpO2 98%   BMI 25.53 kg/m²         Anesthesia Post Evaluation    Patient location during evaluation: bedside  Patient participation: complete - patient participated  Level of consciousness: awake  Pain score: 2  Pain management: adequate  Airway patency: patent  Two or more strategies used to mitigate risk of obstructive sleep apnea  Cardiovascular status: acceptable  Respiratory status: acceptable  Hydration status: acceptable  Postoperative Nausea and Vomiting: none  Comments: See intraoperative record for anesthetic actions related to the preoperative anesthesia plan.        No notable events documented.    "

## 2025-03-19 NOTE — BRIEF OP NOTE
"Date: 3/19/2025  OR Location: [facility]    Name: Fifi Larsen \"Carla\", : 1975, Age: 49 y.o., MRN: [id number], Sex: female    Diagnosis  Pre-op Diagnosis      * Closed torus fracture of distal end of right fibula, initial encounter [S82.821A] Post-op Diagnosis     * Closed torus fracture of distal end of right fibula, initial encounter [S82.821A]     Procedures  ORIF, FRACTURE, FIBULA (arthrex fibula fracture, tight rope, fibertak)  16368 - CA OPEN TX DISTAL FIBULAR FRACTURE LAT MALLEOLUS      Surgeons      * Celi Thao - Primary    Resident/Fellow/Other Assistant:  Surgeons and Role:  * No surgeons found with a matching role *    Staff:   Circulator: Yamile Diopub Person: Seth Diopub Person: Yaneli Augustine Circulator: Mary Kate    Anesthesia Staff: Anesthesiologist: Geoffrey Hair DO  CRNA: GHADA ShepherdCRNA; VICKIE Sanderson    Procedure Summary  Anesthesia: General  ASA: III  Estimated Blood Loss: 25mL  Intra-op Medications:   Administrations occurring from 1110 to 1315 on 25:   Medication Name Total Dose   ceFAZolin (Ancef) 2 g in dextrose (iso)  mL 2 g   dexAMETHasone (Decadron) injection 4 mg/mL 4 mg   fentaNYL (Sublimaze) injection 50 mcg/mL 75 mcg   ketorolac (Toradol) injection 30 mg 30 mg   LR infusion Cannot be calculated   lidocaine PF (Xylocaine-MPF) local injection 1 % 5 mL   ondansetron (Zofran) 2 mg/mL injection 4 mg   propofol (Diprivan) injection 10 mg/mL 500 mg            Anesthesia Record               Intraprocedure I/O Totals          Intake    LR infusion 750.00 mL    Total Intake 750 mL          Specimen: No specimens collected     Findings: R villalpando B fibula fx with deltoid ligament tear s/p ORIF R ankle + deltoid ligament repair    Complications:  None; patient tolerated the procedure well.     Disposition: PACU - hemodynamically stable.  Condition: stable  Specimens Collected: No specimens collected    Manuel Peña MD  Orthopaedic Surgery " PGY-1

## 2025-03-20 ASSESSMENT — PAIN SCALES - GENERAL: PAINLEVEL_OUTOF10: 5 - MODERATE PAIN

## 2025-03-20 NOTE — OP NOTE
"Operative Note     Date: 3/19/2025  OR Location: [facility]    Name: Fifi Larsen \"Carla\", : 1975, Age: 49 y.o., MRN: [id number], Sex: female    Diagnosis  Pre-op Diagnosis      * Closed torus fracture of distal end of right fibula, initial encounter [S82.821A] Post-op Diagnosis     * Closed bimalleolar fracture of right ankle, initial encounter [S82.841A]     Procedures  ORIF, ANKLE  28402 - SC OPEN TREATMENT BIMALLEOLAR ANKLE FRACTURE    ORIF, ANKLE  69870 - SC REPAIR SECONDARY DISRUPTED LIGAMENT ANKLE COLTRL    ORIF, ANKLE  52603 - CHG MANUAL APPL STRESS PHYS/QHP JOINT RADIOGRAPHY      Surgeons      * Celi Thao - Primary    Resident/Fellow/Other Assistant:  Surgeons and Role:  * No surgeons found with a matching role *    Staff:   Circulator: Yamile  Scrub Person: Seth  Scrub Person: Yaneli Augustine Circulator: Mary Kate    Anesthesia Staff: Anesthesiologist: Geoffrey Hair DO  CRNA: PONCE Shepherd-CRNA; PONCE Sanderson-CRNA    Procedure Summary  Anesthesia: General  ASA: III  Estimated Blood Loss: 10 mL  Intra-op Medications:   Administrations occurring from 1110 to 1315 on 25:   Medication Name Total Dose   ceFAZolin (Ancef) 2 g in dextrose (iso)  mL 2 g   dexAMETHasone (Decadron) injection 4 mg/mL 4 mg   fentaNYL (Sublimaze) injection 50 mcg/mL 75 mcg   ketorolac (Toradol) injection 30 mg 30 mg   LR infusion Cannot be calculated   lidocaine PF (Xylocaine-MPF) local injection 1 % 5 mL   ondansetron (Zofran) 2 mg/mL injection 4 mg   propofol (Diprivan) injection 10 mg/mL 500 mg              Anesthesia Record               Intraprocedure I/O Totals          Intake    LR infusion 750.00 mL    Total Intake 750 mL          Specimen: No specimens collected              Drains and/or Catheters: * None in log *    Tourniquet Times:     Total Tourniquet Time Documented:  Thigh (Right) - 49 minutes  Total: Thigh (Right) - 49 minutes      Implants: [employer]  Implants       " "Type Name Action Serial No.      Implant SUTURE ANCHOR, DX FIBERTAK P1 ST & NDLS - [id number] Implanted      Implant SUTURE ANCHOR, DX FIBERTAK P1 ST & NDLS - [id number] Implanted      Implant DX SWIVELOCK, 3.5 X 13.5MM - [id number] Implanted      Screw PLATE, LOCKING, 7H, THIRD TUBULAR, SS - SN/A - [id number] Implanted N/A     Screw SCREW, NON LOCKING, LOW PROFILE, 2.7 X 20MM, CORTICAL, SS - SN/A - [id number] Implanted N/A     Screw SCREW, LOW PROFILE, CORTICAL, 3.5 X 12MM. SS - SN/A - [id number] Implanted N/A     Screw SCREW, LOW PROFILE, CORTICAL, 3.5 X 14MM, SS - SN/A - [id number] Implanted N/A     Screw SCREW, LOW PROFILE, CORTICAL, 3.5 X 16M, SS - SN/A - [id number] Implanted N/A     Screw SCREW, LOW PROFILE, GLORIA, 4.0 X 16MM, SS - SN/A - [id number] Implanted N/A     Screw SCREW, LOW PROFILE, GLORIA, 4.0 X 20MM, SS - SN/A - [id number] Implanted N/A              Findings: Stress view prior to fibular fixation demonstrated significant medial clear space widening, indicating bimalleolar equivalent ankle fracture. Stress views once fibula fixation was complete, demonstrated no syndesmosis widening, but continued medial clear space widening.     Indications: Fifi Larsen \"Carla\" is an 49 y.o. female who presented to clinic with a right ankle fracture.  Weightbearing x-rays in the office demonstrated concerns for medial clear space widening, therefore I consider this an unstable SER ankle fracture, and recommended surgery.    The patient was seen in the preoperative area. The risks, benefits, complications, treatment options, non-operative alternatives, expected recovery and outcomes were discussed with the patient. The possibilities of reaction to medication, pulmonary aspiration, injury to surrounding structures, bleeding, recurrent infection, the need for additional procedures, failure to diagnose a condition, and creating a complication requiring transfusion or operation were discussed with the patient. The " patient concurred with the proposed plan, giving informed consent.  The site of surgery was properly noted/marked if necessary per policy. The patient has been actively warmed in preoperative area. Preoperative antibiotics have been ordered and given within 1 hours of incision. Venous thrombosis prophylaxis have been ordered including unilateral sequential compression device    Procedure Details:   This is a 49 y.o. female  who presented to clinic for evaluation of  A Right ankle fracture.  I recommended operative treatment, in the form of a Right  ankle ORIF.  We discussed the risks and benefits of surgery, including but not limited to, bleeding, infection, damage to nerves and vessels, nonunion, malunion, hardware failure, DVT, need for further surgery, development of arthritis in the future and the risks of anesthesia.  They understood all the risks, all other questions were answered, and consent was obtained.    Details of Operative Procedure:  The patient was met in the preanesthesia holding area. Their correct Right leg was identified and marked. They had a popliteal and saphenous block performed by the anesthesiologist.  They were then brought to the operating room, and and transferred to the operating table.  Gen. anesthesia was then induced.  Patient then had a well-padded thigh tourniquet placed on the operative leg.  A small bump was placed under the ipsilateral hip.  The operative leg was then thoroughly cleansed with a chlorhexidine sponge and soap.  The leg was then prepped and draped in the normal sterile fashion.  Perioperative antibiotics were given.  A timeout was then completed confirming the correct patient, operative site, participants, equipment, antibiotics.  The operative extremity was then exsanguinated with an Esmarch and the tourniquet elevated to 250 mmHg.    Attention was first turned to fluoroscopic assessment of the ankle.  An external rotation stress view was completed, which  demonstrated medial clear space widening, but maintenance of the syndesmosis.  Therefore it was determined that this was a true bimalleolar ankle fracture equivalent, and medial fixation would be required.    Attention was then turned to the medial side. A medial based, longitudinal incision was made centered over the medial malleolus. Sharp dissection was performed down to bone, taken care to protect the saphenous vein and nerve.  The superficial and deep deltoid were noted to be avulsed from the medial malleolus and talus respectively.  A fiber tack suture was placed into the deep deltoid footprint on the talus, and a second suture anchor was placed into the medial malleolus.  Suture was thrown through the deep deltoid and superficial deltoid respectively.    Attention was then turned to the lateral aspect of the ankle. A standard lateral incision was made over the fibula, centered at the fracture site. Sharp dissection was taken down to bone, being mindful of the superficial peroneal nerve, which was not encountered. Sub-periosteal dissection was then performed to expose the fibula.    The fracture ends were identified, and debrided with a rongeurs and curette. The fracture was then thoroughly irrigated.  The fracture ends were then reduced using a combination of reduction forceps. Reduction was confirmed with direct visualization and fluoroscopy.     A 2.7 mm lag screw was then placed using standard technique. The fracture was then deemed stable. Reduction and screw placement was confirmed with fluoroscopy.    A 7-hole, 1/3 tubular plate was then chosen. This was placed into the appropriate position, and fixed to the bone using cortical and cancellous screws. Again, placement was confirmed with fluoroscopy.     At this point, a manual external rotation stress test was repeated using fluoroscopy. The medial clear space continue to widen on stressing, but the syndesmosis was stable.  Therefore it was determined  that syndesmosis fixation would not be required.     Attention was turned back to the medial side of the ankle, and the sutures were tied repairing the deltoid ligament.  The suture tails were secured to the tibia using a 3.5 mm swivel lock.    Final fluoroscopic images were then taken, ensuring proper reduction and hardware placement.     The wounds were copiously irrigated. The subcutaneous tissue closed with 3-0 monocryl, and the tourniquet was then released.  The skin incisions were closed with 3-0 nylon. The wound was dressed with adaptic, gauze, ABD, and webril. They were placed into a well-padded short-leg splint.     Complications:  None; patient tolerated the procedure well.    Disposition: PACU - hemodynamically stable.  Condition: stable     Additional Details:   Post-Operative Course:   She will be Non Weight Bearing for 6 weeks.   Week 1 post-op: Splint off, NWB XRs, incision check, placement into short-leg cast  Week 3 post-op: Cast off, NWB XRs, sutures out, placement into tall pneumatic boot. Ok for ankle ROM  Week 6 post op: WB XR. Start WB in boot. Start PT     Celi Thao MD  3/20/2025  3:13 PM

## 2025-03-24 ENCOUNTER — OFFICE VISIT (OUTPATIENT)
Dept: ORTHOPEDIC SURGERY | Facility: CLINIC | Age: 50
End: 2025-03-24
Payer: COMMERCIAL

## 2025-03-24 ENCOUNTER — HOSPITAL ENCOUNTER (OUTPATIENT)
Dept: RADIOLOGY | Facility: CLINIC | Age: 50
Discharge: HOME | End: 2025-03-24
Payer: COMMERCIAL

## 2025-03-24 DIAGNOSIS — S82.821A CLOSED TORUS FRACTURE OF DISTAL END OF RIGHT FIBULA, INITIAL ENCOUNTER: ICD-10-CM

## 2025-03-24 DIAGNOSIS — S82.831A CLOSED FRACTURE OF DISTAL END OF RIGHT FIBULA, UNSPECIFIED FRACTURE MORPHOLOGY, INITIAL ENCOUNTER: ICD-10-CM

## 2025-03-24 PROCEDURE — 99211 OFF/OP EST MAY X REQ PHY/QHP: CPT | Performed by: ORTHOPAEDIC SURGERY

## 2025-03-24 PROCEDURE — 73610 X-RAY EXAM OF ANKLE: CPT | Mod: RIGHT SIDE | Performed by: RADIOLOGY

## 2025-03-24 PROCEDURE — 73610 X-RAY EXAM OF ANKLE: CPT | Mod: RT

## 2025-03-24 NOTE — PROGRESS NOTES
Fifi Larsen     Surgery Date: 3/19/2025  Surgery: R ankle ORIF    Interval History:  They are now 1 week(s) since surgery. They report no CP/SOB. Has been NWB in her splint.     ASSESSMENT/PLAN:  - NWB   - Cast today  - Continue  mg BID    Follow up in 2 weeks, with NWB R ankle films upon return.      Physical Exam:  Well appearing female in no acute distress; Alert and oriented.  RIGHT LEFT BILAT: Right Leg:  Incision is clean dry and intact.  There is no erythema warmth or drainage.  They have palpable pulses, sensation is intact.  They are able to flex and extend their toes and ankles without difficulty      Radiographs:  Imaging was ordered today. Final results and radiologist's interpretation, available in the [facility] health record. Images were reviewed with the patient/family members in the office today. My personal interpretation of the performed imaging is unchanged hardware position    Medication, History, and Allergies were reviewed and updated in the medical record.    Celi Thao MD

## 2025-04-07 ENCOUNTER — HOSPITAL ENCOUNTER (OUTPATIENT)
Dept: RADIOLOGY | Facility: CLINIC | Age: 50
Discharge: HOME | End: 2025-04-07
Payer: COMMERCIAL

## 2025-04-07 ENCOUNTER — OFFICE VISIT (OUTPATIENT)
Dept: ORTHOPEDIC SURGERY | Facility: CLINIC | Age: 50
End: 2025-04-07
Payer: COMMERCIAL

## 2025-04-07 DIAGNOSIS — S82.831D CLOSED FRACTURE OF DISTAL END OF RIGHT FIBULA WITH ROUTINE HEALING, UNSPECIFIED FRACTURE MORPHOLOGY, SUBSEQUENT ENCOUNTER: Primary | ICD-10-CM

## 2025-04-07 DIAGNOSIS — S82.821A CLOSED TORUS FRACTURE OF DISTAL END OF RIGHT FIBULA, INITIAL ENCOUNTER: ICD-10-CM

## 2025-04-07 DIAGNOSIS — S82.831A CLOSED FRACTURE OF DISTAL END OF RIGHT FIBULA, UNSPECIFIED FRACTURE MORPHOLOGY, INITIAL ENCOUNTER: ICD-10-CM

## 2025-04-07 PROCEDURE — 99211 OFF/OP EST MAY X REQ PHY/QHP: CPT | Performed by: ORTHOPAEDIC SURGERY

## 2025-04-07 PROCEDURE — 73610 X-RAY EXAM OF ANKLE: CPT | Mod: RIGHT SIDE | Performed by: RADIOLOGY

## 2025-04-07 PROCEDURE — 73610 X-RAY EXAM OF ANKLE: CPT | Mod: RT

## 2025-04-07 NOTE — PROGRESS NOTES
Fifi Larsen     Surgery Date: 3/19/25  Surgery: R ankle ORIF    Interval History:  They are now 3 week(s) since surgery. They report minimal pain. Has been taking her ASA. No CP/SOB. Has been NWB in cast    ASSESSMENT/PLAN:  - Sutures out today  - Boot  - NWB  - Continue ASA x 3 more weeks      Follow up in 3 weeks, with R ankle WB films upon return.      Physical Exam:  Well appearing female in no acute distress; Alert and oriented.  Right  Leg:  Incision is clean dry and intact.  There is no erythema warmth or drainage.  They have palpable pulses, sensation is intact.  They are able to flex and extend their toes and ankles without difficulty        Radiographs:  Imaging was ordered today. Final results and radiologist's interpretation, available in the [facility] health record. Images were reviewed with the patient/family members in the office today. My personal interpretation of the performed imaging is healing fibula fracture with maintained alignment.     Medication, History, and Allergies were reviewed and updated in the medical record.    Celi Thao MD

## 2025-04-23 NOTE — PROGRESS NOTES
Fifi Larsen     Surgery Date: 3/19/2025  Surgery: R ankle ORIF    Interval History:  They are now 6 week(s) since surgery. They report minimal pain. Has been doing ROM in the boot, NWB    ASSESSMENT/PLAN:  - start WBAT  - Wean boot as able  - Start PT    Follow up in 6 weeks, with R ankle films upon return.      Physical Exam:  Well appearing female in no acute distress; Alert and oriented.  Right  Leg:  Incision is clean dry and intact.  There is no erythema warmth or drainage.  They have palpable pulses, sensation is intact.  They are able to flex and extend their toes and ankles without difficulty    Radiographs:  Imaging was ordered today. Final results and radiologist's interpretation, available in the [facility] health record. Images were reviewed with the patient/family members in the office today. My personal interpretation of the performed imaging is healing fracture    Medication, History, and Allergies were reviewed and updated in the medical record.    Celi Thao MD

## 2025-04-28 ENCOUNTER — HOSPITAL ENCOUNTER (OUTPATIENT)
Dept: RADIOLOGY | Facility: CLINIC | Age: 50
Discharge: HOME | End: 2025-04-28
Payer: COMMERCIAL

## 2025-04-28 ENCOUNTER — OFFICE VISIT (OUTPATIENT)
Dept: ORTHOPEDIC SURGERY | Facility: CLINIC | Age: 50
End: 2025-04-28
Payer: COMMERCIAL

## 2025-04-28 ENCOUNTER — APPOINTMENT (OUTPATIENT)
Dept: ORTHOPEDIC SURGERY | Facility: CLINIC | Age: 50
End: 2025-04-28
Payer: COMMERCIAL

## 2025-04-28 DIAGNOSIS — S82.831D CLOSED FRACTURE OF DISTAL END OF RIGHT FIBULA WITH ROUTINE HEALING, UNSPECIFIED FRACTURE MORPHOLOGY, SUBSEQUENT ENCOUNTER: Primary | ICD-10-CM

## 2025-04-28 DIAGNOSIS — S82.831D CLOSED FRACTURE OF DISTAL END OF RIGHT FIBULA WITH ROUTINE HEALING, UNSPECIFIED FRACTURE MORPHOLOGY, SUBSEQUENT ENCOUNTER: ICD-10-CM

## 2025-04-28 PROCEDURE — 99211 OFF/OP EST MAY X REQ PHY/QHP: CPT | Performed by: ORTHOPAEDIC SURGERY

## 2025-04-28 PROCEDURE — 73610 X-RAY EXAM OF ANKLE: CPT | Mod: RT

## 2025-04-28 PROCEDURE — 73610 X-RAY EXAM OF ANKLE: CPT | Mod: RIGHT SIDE | Performed by: RADIOLOGY

## 2025-04-29 NOTE — PROGRESS NOTES
"  Physical Therapy  Physical Therapy Orthopedic Evaluation    Patient Name: Fifi Larsen \"Carla\"  MRN: [id number]  Today's Date: 4/30/2025  Time Calculation  Start Time: 0746  Stop Time: 0825  Time Calculation (min): 39 min    Insurance:  Visit number: 1 of 60  Authorization info: no auth   Insurance Type: Aetna     General:  Reason for visit: Closed Fx of distal R fibula ORIF   Referred by: Celi Thao     Current Problem:  1. Closed fracture of distal end of right fibula with routine healing, unspecified fracture morphology, subsequent encounter  Referral to Physical Therapy    Follow Up In Physical Therapy      2. Decreased range of motion of right ankle            Precautions: ~6 weeks post op   - start WBAT  - Wean boot as able  Precautions  JOVANADI Fall Risk Score (The score of 4 or more indicates an increased risk of falling): 4  Precautions Comment: Falls risk      Medical History Form: Reviewed (scanned into chart)    Subjective:   49 y.o. female presents to the clinic s/p closed torus fx of distal end of her R fibula.  Injury occurred when she rolled her ankle while wearing heals on 3/14/25  Yesterday was her first day walking w/o her frost.   Was about 50% out of her boot at home.  Notes minimal pain, mostly discomfort in her achillis due to tightness.  Is eager to get back and moving on her feet.  Likes to run 3 to 4 miles a day x3 days a week, also likes to lift weights.  Primary concern was what would make her s/s worse.         Onset Date: 3/19/25  JUAN: rolled ankle     Current Condition:   Same    Pain:  Pain Assessment: 0-10  0-10 (Numeric) Pain Score: 1  Pain Type: Surgical pain  Pain Location: Ankle  Pain Orientation: Right  Pain Descriptors: Tightness  Pain Frequency: Intermittent  Pain Onset: Ongoing  Location: R achillis   Description: tightness   Aggravating Factors: DF  Relieving Factors:  rest    Relevant Information (PMH & Previous Tests/Imaging): x-ray  Previous " Interventions/Treatments: None    Prior Level of Function (PLOF)  Patient previously independent with all ADLs  Exercise/Physical Activity: Runner & lifts weights   Work/School:     Patients Living Environment: Reviewed and no concern    Primary Language: English    There are no spiritual/cultural practices/values/needs that are important to know    Patient's Goal(s) for Therapy: Return to prior activity     Red Flags: Do you have any of the following? No  Fever/chills, unexplained weight changes, dizziness/fainting, unexplained change in bowel or bladder functions, unexplained malaise or muscle weakness, night pain/sweats, numbness or tingling    Objective:  Objective       Ankle figure 4 measure    L: 20 in  R:21 in     ROM       Knee AROM (Degrees)    WNL         Ankle AROM (Degrees)      (R)  (L)  Plantarflexion: 44  72    Dorsiflexion: 4  8    Inversion: 4  25     Eversion: 2  7         Ankle PROM (Degrees)      (R)  (L)  Plantarflexion: 48       Dorsiflexion: 5      Inversion: 6       Eversion: 3               Strength Testing    Hip  Will be formally assessed in subsequent visits     Knee    (R)  (L)  Flexion: 5  5     Extension: 5  5        Ankle    (R)  (L)  Plantarflexion: 3  3      Dorsiflexion: 5  5    Inversion: 5  5     Eversion: 5  5         Posture: slightly forward flexed       Palpation: tenderness to palpation over lateral incision of lateral malleolus       Ankle/Foot Joint Mobility: decreased grossly       Observation: dry skin at plantar aspect of her foot       Gait: Donning boot, no deviations noted      Outcome Measures:  Other Measures  Lower Extremity Funtional Score (LEFS): 43       EDUCATION: Home exercise program, plan of care, activity modifications, pain management, and injury pathology       Goals: Set and discussed today  Active       PT Problem       PT Goal 1       Start:  04/30/25    Expected End:  06/25/25       1) Patient will report independence in their HEP within 1 week  "demonstrating steps towards independent home care management.   2) Patient will demonstrate PF strength that is a 5/5 MMT grade demonstrating improvements in strength required for functional movements within 4 weeks.   3) Patient will report absent pain with stretching & standing demonstrating improvements in symptom management within 4 weeks.          PT Goal 2       Start:  04/30/25    Expected End:  06/25/25       1) Patient will report at least a 75/80 on the LEFS demonstrating improvements in symptom management with activity and overall ADL participation within 8 weeks.   2) Patient will demonstrate the ability to perform 5 consecutive 6\" SL tap downs demonstrating improvements in strength and ankle stability needed for her her ADLs/IADLs,/exercise of choice within 8 weeks.  3) Patient will report a full return to weight training w/o pain demonstrating improvements in her functional capacity within 8 weeks.   4) patient will report the ability to run a mile w/o pain demonstrating improvements in functional capacity within 8 weeks.                Plan of care was developed with input and agreement by the patient    Treatment Performed:    Therapeutic Exercise:    16 min  Access Code: [email or web address]  URL: [email or web address]  Date: 04/30/2025  Prepared by: Liliana Moise    Exercises  - Seated Ankle Inversion Eversion PROM  - 1 x daily - 7 x weekly - 2 sets - 30 sec hold  - Seated Ankle Inversion Eversion PROM  - 1 x daily - 7 x weekly - 2 sets - 30 sec hold  - Seated Ankle Plantarflexion Dorsiflexion PROM  - 1 x daily - 7 x weekly - 2 sets - 30 sec hold  - Seated Ankle Alphabet  - 1 x daily - 7 x weekly - 2 sets  - Gastroc Stretch on Wall  - 1 x daily - 7 x weekly - 2 sets - 20 reps  - Side to Side Weight Shift with Counter Support  - 1 x daily - 7 x weekly - 2 sets - 20 reps      Manual Therapy:    8 min  Stretching gross ankle ROM     Units:  Eval low  MT, TE  PT Evaluation Time Entry  PT " Evaluation (Low) Time Entry: 15  PT Therapeutic Procedures Time Entry  Manual Therapy Time Entry: 8  Therapeutic Exercise Time Entry: 16                      Assessment: Fifi Larsen presents to the clinic with S/P R fibular fx w/ ORIF , resulting in limitations primarily in their ADLs pain free & inability to perform at their PLOF.  Most notable limitations include their ability to stand pain free in a full WB position w/o her boot.  Today's finding found deficits in in ankle strength and ROM.  Future sessions will focus on interventions including improving her tolerance to full WB, improving her strength, & ROM to address said deficits.  The skills of a physical therapist are required to address the deficits found during today's evaluation, while progressing them to a full return to their PLOF.  Patient demonstrates excellent rehab potential because of her age, history of being active, & good overall health.                Clinical Presentation: Stable and/or uncomplicated characteristics    Plan:     Planned Interventions include: therapeutic exercise, self-care home management, manual therapy, therapeutic activities, gait training, neuromuscular coordination, vasopneumatic, dry needling, aquatic therapy  Frequency: 1 x Week  Duration: 8 Weeks  Rehab Potential/Prognosis: Excellent    Liliana Moise, PT

## 2025-04-30 ENCOUNTER — EVALUATION (OUTPATIENT)
Dept: PHYSICAL THERAPY | Facility: CLINIC | Age: 50
End: 2025-04-30
Payer: COMMERCIAL

## 2025-04-30 DIAGNOSIS — M25.671 DECREASED RANGE OF MOTION OF RIGHT ANKLE: ICD-10-CM

## 2025-04-30 DIAGNOSIS — S82.831D CLOSED FRACTURE OF DISTAL END OF RIGHT FIBULA WITH ROUTINE HEALING, UNSPECIFIED FRACTURE MORPHOLOGY, SUBSEQUENT ENCOUNTER: Primary | ICD-10-CM

## 2025-04-30 PROCEDURE — 97140 MANUAL THERAPY 1/> REGIONS: CPT | Mod: GP

## 2025-04-30 PROCEDURE — 97110 THERAPEUTIC EXERCISES: CPT | Mod: GP

## 2025-04-30 PROCEDURE — 97161 PT EVAL LOW COMPLEX 20 MIN: CPT | Mod: GP

## 2025-04-30 ASSESSMENT — PAIN SCALES - GENERAL: PAINLEVEL_OUTOF10: 1

## 2025-04-30 ASSESSMENT — PAIN - FUNCTIONAL ASSESSMENT: PAIN_FUNCTIONAL_ASSESSMENT: 0-10

## 2025-04-30 ASSESSMENT — PAIN DESCRIPTION - DESCRIPTORS: DESCRIPTORS: TIGHTNESS

## 2025-05-02 ENCOUNTER — TREATMENT (OUTPATIENT)
Dept: PHYSICAL THERAPY | Facility: CLINIC | Age: 50
End: 2025-05-02
Payer: COMMERCIAL

## 2025-05-02 DIAGNOSIS — M25.671 DECREASED RANGE OF MOTION OF RIGHT ANKLE: ICD-10-CM

## 2025-05-02 DIAGNOSIS — S82.831D CLOSED FRACTURE OF DISTAL END OF RIGHT FIBULA WITH ROUTINE HEALING, UNSPECIFIED FRACTURE MORPHOLOGY, SUBSEQUENT ENCOUNTER: Primary | ICD-10-CM

## 2025-05-02 PROCEDURE — 97110 THERAPEUTIC EXERCISES: CPT | Mod: GP

## 2025-05-02 PROCEDURE — 97140 MANUAL THERAPY 1/> REGIONS: CPT | Mod: GP

## 2025-05-02 NOTE — PROGRESS NOTES
"  Physical Therapy  Physical Therapy Treatment Note    Patient Name: Fifi Larsen \"Carla\"  MRN: [id number]  Today's Date: 2025  Time Calculation  Start Time: 0700  Stop Time: 07  Time Calculation (min): 42 min    Insurance:  Visit number: 2  60  Authorization info: no auth   Insurance Type: Aetna     General:  Reason for visit: Closed Fx of distal R fibula ORIF   Referred by: Celi Thao       Current Problem  1. Closed fracture of distal end of right fibula with routine healing, unspecified fracture morphology, subsequent encounter        2. Decreased range of motion of right ankle            Precautions: None      Subjective:     Patient reports as of yesterday she has discontinued her boot.  Notes tightness in her achilles but absent pain.     Pain   0/10    Performing HEP?: Yes      Objective:                           (R)                    (L)  Plantarflexion:  45                     72                       Dorsiflexion:     5                      8                        Inversion:           25                    25                                   Eversion:          7                     7                                       DF in WB 3 degrees    SL stance  L: 20  R; 19    Gait: decreased heel strike R, improved with cuing   Treatment Performed:      Therapeutic Exercise:    27 min  Foot elevated DF with 2 sec holds x20  Green band resisted PF, env, INV  Step ups   BW squats  Calf raise w/ weight shift  Tandem stance walks      Manual Therapy:    10 min  Stretching inv,evr, DF,PF    Neuromuscular Re-education:   min      Gait trainin min  Cuing     Units  TE-2, MT-1    Current HEP:  Access Code: [email or web address]  URL: [email or web address]  Date: 2025  Prepared by: Liliana Moise    Exercises  - Seated Ankle Inversion Eversion PROM  - 1 x daily - 7 x weekly - 2 sets - 30 sec hold  - Seated Ankle Inversion Eversion PROM  - 1 x daily - 7 x weekly - 2 sets - 30 " sec hold  - Seated Ankle Plantarflexion Dorsiflexion PROM  - 1 x daily - 7 x weekly - 2 sets - 30 sec hold  - Gastroc Stretch on Wall  - 1 x daily - 7 x weekly - 2 sets - 20 reps  - Side to Side Weight Shift with Counter Support  - 1 x daily - 7 x weekly - 2 sets - 20 reps  - Seated Ankle Inversion with Resistance and Legs Crossed  - 1 x daily - 7 x weekly - 3 sets - 10 reps  - Seated Ankle Eversion with Resistance  - 1 x daily - 7 x weekly - 3 sets - 10 reps  - Long Sitting Eccentric Ankle Plantar Flexion with Resistance  - 1 x daily - 7 x weekly - 3 sets - 10 reps     PT Therapeutic Procedures Time Entry  Manual Therapy Time Entry: 10  Therapeutic Exercise Time Entry: 27  Gait Training Time Entry: 5                      Assessment:   Fifi Larsen is progressing well in physical therapy.  Patient demonstrated improvements in ankle ROM grossly and tolerance to WB position outside of her boot between sessions.  Today's session focused on progressing her ROM with manual therapy and increasing her tolerance to increased force through her feet with more dynamic WB positions, they tolerated today's session well.  Demonstrating expected muscular fatigue and tightness in her A tib muscle.  However, patient continues to demonstrate deficits in ROM and ankle strength.  The skills of a physical therapist are required to continue to progress their deficits in these areas, returning them to a full participation in all their ADL w/o pain/limitations.          Plan:  Stretching as needed, progressive ankle strengthening, progress WB activity      Liliana Moise, PT

## 2025-05-05 ENCOUNTER — APPOINTMENT (OUTPATIENT)
Dept: PHYSICAL THERAPY | Facility: CLINIC | Age: 50
End: 2025-05-05
Payer: COMMERCIAL

## 2025-05-05 DIAGNOSIS — M25.671 DECREASED RANGE OF MOTION OF RIGHT ANKLE: ICD-10-CM

## 2025-05-05 DIAGNOSIS — S82.831D CLOSED FRACTURE OF DISTAL END OF RIGHT FIBULA WITH ROUTINE HEALING, UNSPECIFIED FRACTURE MORPHOLOGY, SUBSEQUENT ENCOUNTER: Primary | ICD-10-CM

## 2025-05-07 ENCOUNTER — TREATMENT (OUTPATIENT)
Dept: PHYSICAL THERAPY | Facility: CLINIC | Age: 50
End: 2025-05-07
Payer: COMMERCIAL

## 2025-05-07 DIAGNOSIS — S82.831D CLOSED FRACTURE OF DISTAL END OF RIGHT FIBULA WITH ROUTINE HEALING, UNSPECIFIED FRACTURE MORPHOLOGY, SUBSEQUENT ENCOUNTER: Primary | ICD-10-CM

## 2025-05-07 DIAGNOSIS — M25.671 DECREASED RANGE OF MOTION OF RIGHT ANKLE: ICD-10-CM

## 2025-05-07 PROCEDURE — 97110 THERAPEUTIC EXERCISES: CPT | Mod: GP

## 2025-05-07 NOTE — PROGRESS NOTES
"  Physical Therapy  Physical Therapy Treatment Note    Patient Name: Fifi Larsen \"Carla\"  MRN: [id number]  Today's Date: 5/7/2025  Time Calculation  Start Time: 0830  Stop Time: 0911  Time Calculation (min): 41 min    Insurance:  Visit number: 3 of 60  Authorization info: no auth   Insurance Type: Aetna     General:  Reason for visit: Closed Fx of distal R fibula ORIF   Referred by: Celi Thao       Current Problem  1. Closed fracture of distal end of right fibula with routine healing, unspecified fracture morphology, subsequent encounter  Follow Up In Physical Therapy      2. Decreased range of motion of right ankle            Precautions: None      Subjective:     Patient reports she continues to feel her ankle mobility is improving daily. Notes great toe pain, has tried stretching PRN    Pain   0/10    Performing HEP?: Yes      Objective:                           (R)                    (L)  Plantarflexion:  50                     72                       Dorsiflexion:     6                      8                        Inversion:           25                    25                                   Eversion:          7                     7                                       DF in WB 3 degrees    SL stance  L: 20  R; 20    Gait: improved heel strike between sessions, improved gait vanessa/speed      Therapeutic Exercise:    31 min  Green band lateral walks  Green band monster walks  Tandem walks   Walking lunge   Step up w/ hip flexion contralateral  SL leg press calf raise 40# 3x15  SL stance ball toss forward and lateral   Dynamic lunge w/ ball toss 2 KG      Manual Therapy:    10 min  Stretching inv,evr, DF,PF, great toe     Neuromuscular Re-education:   min      Gait training:      min      Units  TE-2, MT-1    Current HEP:  Access Code: [email or web address]  URL: [email or web address]  Date: 05/02/2025  Prepared by: Liliana Moise    Exercises  - Seated Ankle Inversion Eversion PROM  " - 1 x daily - 7 x weekly - 2 sets - 30 sec hold  - Seated Ankle Inversion Eversion PROM  - 1 x daily - 7 x weekly - 2 sets - 30 sec hold  - Seated Ankle Plantarflexion Dorsiflexion PROM  - 1 x daily - 7 x weekly - 2 sets - 30 sec hold  - Gastroc Stretch on Wall  - 1 x daily - 7 x weekly - 2 sets - 20 reps  - Side to Side Weight Shift with Counter Support  - 1 x daily - 7 x weekly - 2 sets - 20 reps  - Seated Ankle Inversion with Resistance and Legs Crossed  - 1 x daily - 7 x weekly - 3 sets - 10 reps  - Seated Ankle Eversion with Resistance  - 1 x daily - 7 x weekly - 3 sets - 10 reps  - Long Sitting Eccentric Ankle Plantar Flexion with Resistance  - 1 x daily - 7 x weekly - 3 sets - 10 reps     PT Therapeutic Procedures Time Entry  Therapeutic Exercise Time Entry: 41                      Assessment:   Fifi Larsen is progressing well in physical therapy.  Patient demonstrated improvements in gait fluidity, ankle ROM, & LE strength between sessions.  Today's session focused on progressing her ankle ROM with manual therapy & challanging her multi-directional stability with dynamic and & SL movements, they tolerated today's session well.  Demonstrating expected within session fatigue.  However, patient continues to demonstrate deficits in ankile ROM, balance, & LE strength.  The skills of a physical therapist are required to continue to progress their deficits in these areas, returning them to a full participation in all their ADL w/o pain/limitations.          Plan:  Stretching as needed, progressive ankle strengthening, progress WB activity      Liliana Moise, PT

## 2025-05-09 ENCOUNTER — TREATMENT (OUTPATIENT)
Dept: PHYSICAL THERAPY | Facility: CLINIC | Age: 50
End: 2025-05-09
Payer: COMMERCIAL

## 2025-05-09 DIAGNOSIS — S82.831D CLOSED FRACTURE OF DISTAL END OF RIGHT FIBULA WITH ROUTINE HEALING, UNSPECIFIED FRACTURE MORPHOLOGY, SUBSEQUENT ENCOUNTER: Primary | ICD-10-CM

## 2025-05-09 DIAGNOSIS — M25.671 DECREASED RANGE OF MOTION OF RIGHT ANKLE: ICD-10-CM

## 2025-05-09 PROCEDURE — 97110 THERAPEUTIC EXERCISES: CPT | Mod: GP

## 2025-05-09 NOTE — PROGRESS NOTES
"  Physical Therapy  Physical Therapy Treatment Note    Patient Name: Fifi Larsen \"Carla\"  MRN: [id number]  Today's Date: 5/9/2025  Time Calculation  Start Time: 0700  Stop Time: 0741  Time Calculation (min): 41 min    Insurance:  Visit number: 4 of 60  Authorization info: no auth   Insurance Type: Aetna     General:  Reason for visit: Closed Fx of distal R fibula ORIF   Referred by: Celi Thao       Current Problem  1. Closed fracture of distal end of right fibula with routine healing, unspecified fracture morphology, subsequent encounter        2. Decreased range of motion of right ankle            Precautions: None      Subjective:   Patient reports increased soreness after Tuesdays session & being up onher feet all day hosting her daughters birthday party.     Pain   0/10    Performing HEP?: Yes      Objective:                           (R)                    (L)  Plantarflexion:  50                     72                       Dorsiflexion:     6                      8                        Inversion:           25                    25                                   Eversion:          7                     7                                       DF in WB 3 degrees    SL stance  L: 20  R; 20        Therapeutic Exercise:    33 min  SL tap down  2KG ball toss and catch SL step up   2KG ball toss and catch lateral lunge to SL   Squat to calf raise green KB  Sport cord lateral walks   SL RDL crossover- difficult      Manual Therapy:    8 min  Stretching inv,evr, DF,PF, great toe     Neuromuscular Re-education:   min      Gait training:      min      Units  TE-2, MT-1    Current HEP:  Access Code: [email or web address]  URL: [email or web address]  Date: 05/02/2025  Prepared by: Liliana Moise    Exercises  - Seated Ankle Inversion Eversion PROM  - 1 x daily - 7 x weekly - 2 sets - 30 sec hold  - Seated Ankle Inversion Eversion PROM  - 1 x daily - 7 x weekly - 2 sets - 30 sec hold  - Seated " Ankle Plantarflexion Dorsiflexion PROM  - 1 x daily - 7 x weekly - 2 sets - 30 sec hold  - Gastroc Stretch on Wall  - 1 x daily - 7 x weekly - 2 sets - 20 reps  - Side to Side Weight Shift with Counter Support  - 1 x daily - 7 x weekly - 2 sets - 20 reps  - Seated Ankle Inversion with Resistance and Legs Crossed  - 1 x daily - 7 x weekly - 3 sets - 10 reps  - Seated Ankle Eversion with Resistance  - 1 x daily - 7 x weekly - 3 sets - 10 reps  - Long Sitting Eccentric Ankle Plantar Flexion with Resistance  - 1 x daily - 7 x weekly - 3 sets - 10 reps     PT Therapeutic Procedures Time Entry  Therapeutic Exercise Time Entry: 41                      Assessment:   Fifi Larsen is progressing well in physical therapy.  Patient demonstrated improvements in SL balance and ankle strength between sessions.  Today's session focused on progressing both of these areas, especially challenging her dynamic balance they tolerated today's session well.  Demonstrating expected within session fatigue.  However, patient continues to demonstrate deficits in ankle/LE balance & strength  The skills of a physical therapist are required to continue to progress their deficits in these areas, returning them to a full participation in all their ADL w/o pain/limitations.            Plan:  Stretching as needed, progressive ankle strengthening, progress WB activity      Liliana Moise, PT

## 2025-05-16 ENCOUNTER — TREATMENT (OUTPATIENT)
Dept: PHYSICAL THERAPY | Facility: CLINIC | Age: 50
End: 2025-05-16
Payer: COMMERCIAL

## 2025-05-16 DIAGNOSIS — M25.671 DECREASED RANGE OF MOTION OF RIGHT ANKLE: ICD-10-CM

## 2025-05-16 DIAGNOSIS — S82.831D CLOSED FRACTURE OF DISTAL END OF RIGHT FIBULA WITH ROUTINE HEALING, UNSPECIFIED FRACTURE MORPHOLOGY, SUBSEQUENT ENCOUNTER: Primary | ICD-10-CM

## 2025-05-16 PROCEDURE — 97110 THERAPEUTIC EXERCISES: CPT | Mod: GP

## 2025-05-16 NOTE — PROGRESS NOTES
"  Physical Therapy  Physical Therapy Treatment Note    Patient Name: Fifi Larsen \"Carla\"  MRN: [id number]  Today's Date: 5/16/2025  Time Calculation  Start Time: 0700  Stop Time: 0745  Time Calculation (min): 45 min    Insurance:  Visit number: 5 of 60  Authorization info: no auth   Insurance Type: Aetna     General:  Reason for visit: Closed Fx of distal R fibula ORIF   Referred by: Celi Thao       Current Problem  1. Closed fracture of distal end of right fibula with routine healing, unspecified fracture morphology, subsequent encounter  Follow Up In Physical Therapy      2. Decreased range of motion of right ankle            Precautions: None      Subjective:   Patient reports she continues to feel tightness in her great toe. Just got back from [place] said she noticed her decreased R ankle PF in the water.     Pain   0/10    Performing HEP?: Yes      Objective:                           (R)                    (L)  Plantarflexion:  54                     72                       Dorsiflexion:     6                      8                        Inversion:           25                    25                                   Eversion:          7                     7                                       DF in WB 3 degrees    SL stance on airex  L: 20  R; 20        Therapeutic Exercise:    40 min  SL tap down  Green band ext/abd  Walking lunge/side lunge grey KB  Sliders w/ 3 sec hold lateral/backwards   SL tap down isos  Airex ball toss   Green band lateral walks       Manual Therapy:    5 min  Stretching inv,evr, DF,PF    Neuromuscular Re-education:   min      Gait training:      min      Units  TE-3    Current HEP:  Access Code: [email or web address]  URL: [email or web address]  Date: 05/09/2025  Prepared by: Liliana Moise    Exercises  - Heel Raises with Counter Support  - 1 x daily - 7 x weekly - 3 sets - 10 reps  - Lateral Lunge  - 1 x daily - 7 x weekly - 3 sets - 10 reps  - Standard " Lunge  - 1 x daily - 7 x weekly - 3 sets - 10 reps  - Gastroc Stretch on Wall  - 1 x daily - 7 x weekly - 3 sets - 10 reps  - Hip Hiking on Step  - 1 x daily - 7 x weekly - 3 sets - 10 reps     PT Therapeutic Procedures Time Entry  Manual Therapy Time Entry: 5  Therapeutic Exercise Time Entry: 40                      Assessment:   Fifi Larsen is progressing well in physical therapy.  Patient demonstrated improvements in her ankle stability, LE strength, and gait mechanics between sessions.  Today's session focused on progressing her SL balance and LE strength, they tolerated today's session well.  Demonstrating expected within session fatigue.  However, patient continues to demonstrate deficits in ankle PF ROM & ankle balance.  The skills of a physical therapist are required to continue to progress their deficits in, returning them to a full participation in all their ADL w/o pain/limitations.  Patient will drop from two times a week to one time per week, as agreed upon by the patient.             Plan:  Stretching as needed, progressive ankle strengthening, progress WB activity, return to jogging       Liliana Moise, PT

## 2025-05-21 ENCOUNTER — TREATMENT (OUTPATIENT)
Dept: PHYSICAL THERAPY | Facility: CLINIC | Age: 50
End: 2025-05-21
Payer: COMMERCIAL

## 2025-05-21 DIAGNOSIS — M25.671 DECREASED RANGE OF MOTION OF RIGHT ANKLE: ICD-10-CM

## 2025-05-21 DIAGNOSIS — S82.831D CLOSED FRACTURE OF DISTAL END OF RIGHT FIBULA WITH ROUTINE HEALING, UNSPECIFIED FRACTURE MORPHOLOGY, SUBSEQUENT ENCOUNTER: ICD-10-CM

## 2025-05-21 DIAGNOSIS — S82.831D: Primary | ICD-10-CM

## 2025-05-21 PROCEDURE — 97112 NEUROMUSCULAR REEDUCATION: CPT | Mod: GP,CQ | Performed by: SPECIALIST/TECHNOLOGIST

## 2025-05-21 PROCEDURE — 97140 MANUAL THERAPY 1/> REGIONS: CPT | Mod: GP,CQ | Performed by: SPECIALIST/TECHNOLOGIST

## 2025-05-21 PROCEDURE — 97110 THERAPEUTIC EXERCISES: CPT | Mod: GP,CQ | Performed by: SPECIALIST/TECHNOLOGIST

## 2025-05-21 ASSESSMENT — PAIN SCALES - GENERAL: PAINLEVEL_OUTOF10: 0 - NO PAIN

## 2025-05-21 ASSESSMENT — PAIN - FUNCTIONAL ASSESSMENT: PAIN_FUNCTIONAL_ASSESSMENT: 0-10

## 2025-05-21 NOTE — PROGRESS NOTES
"  Physical Therapy  Physical Therapy Treatment Note    Patient Name: Fifi Larsen \"Carla\"  MRN: [id number]  Today's Date: 5/21/2025  Time Calculation  Start Time: 0900  Stop Time: 0945  Time Calculation (min): 45 min    Insurance:  Visit number: 6 of 60  Authorization info: no auth   Insurance Type: Aetna     General:  Reason for visit: Closed Fx of distal R fibula ORIF   Referred by: Celi Thao       Current Problem  1. Closed fracture of distal end of right fibula with routine healing        2. Decreased range of motion of right ankle        3. Closed fracture of distal end of right fibula with routine healing, unspecified fracture morphology, subsequent encounter  Follow Up In Physical Therapy            Precautions: None      Subjective:   Patient arrived full weight bearing without a limp noting no ankle pain only limited by toe discomfort (1 of 10).  Patient notes as toe soreness increases she will limp.       Pain  Pain Assessment: 0-10  0-10 (Numeric) Pain Score: 0 - No pain0/10    Performing HEP?: Yes      Objective:     Forefoot edema none  Figure 8 girth R>L 2.5 cm  Great toe limited flexion  Ankle AROM pre 11°-0°-48° post 11°-0°-54°  1st MTP mobility 01  Ankle mobility 00     DF in WB 3 degrees    SL stance on airex  L: 20  R; 20        Therapeutic Exercise:    25 min  Stepper L2 5 min   Bal BD DF/PF 15#/15# 2'   Bal BD I/E 10#/5# 2'   Toe Press 3 pos 30# 20x  Incline stretch 2 pos 1'       Manual Therapy:    10 min  Great toe mobility ML/AP/long axis  Ankle mobs Drawer, Tilt, long axis, sub talar rock     Neuromuscular Re-education:   10 min  DBE 2x2 min   4 kg KB tandem & swing cw/ccw 10x R/L   4 kg KB SL RDL R/L 10x     Gait training:      min      Units  TE, NME, Man     Current HEP:  Access Code: [email or web address]  URL: [email or web address]  Date: 05/09/2025  Prepared by: Liliana Moise    Exercises  - Heel Raises with Counter Support  - 1 x daily - 7 x weekly - 3 sets - 10 " reps  - Lateral Lunge  - 1 x daily - 7 x weekly - 3 sets - 10 reps  - Standard Lunge  - 1 x daily - 7 x weekly - 3 sets - 10 reps  - Gastroc Stretch on Wall  - 1 x daily - 7 x weekly - 3 sets - 10 reps  - Hip Hiking on Step  - 1 x daily - 7 x weekly - 3 sets - 10 reps     PT Therapeutic Procedures Time Entry  Manual Therapy Time Entry: 10  Neuromuscular Re-Education Time Entry: 15  Therapeutic Exercise Time Entry: 20                      Assessment:   Fifi Larsen is progressing well in physical therapy.   Today's session focused on progressing her LE strength and ankle mobility. She tolerated today's session well.  Demonstrating expected results within session fatigue.  However, patient continues to demonstrate deficits in ankle PF ROM & ankle balance.  The skills of a physical therapist are required to continue to progress their deficits in, returning them to a full participation in all their ADL w/o pain/limitations.      Patient noted challenge of ankle I/E isometrics and noted feeling looser with better gait post treatment.    .        Plan:  Stretching as needed, progressive ankle strengthening, progress WB activity, return to jogging   Continue to improve ROM, strength, flexibility and balance to improve gait and ADL      Derrell Betancur, PTA

## 2025-05-28 ENCOUNTER — TREATMENT (OUTPATIENT)
Dept: PHYSICAL THERAPY | Facility: CLINIC | Age: 50
End: 2025-05-28
Payer: COMMERCIAL

## 2025-05-28 DIAGNOSIS — S82.831D CLOSED FRACTURE OF DISTAL END OF RIGHT FIBULA WITH ROUTINE HEALING, UNSPECIFIED FRACTURE MORPHOLOGY, SUBSEQUENT ENCOUNTER: ICD-10-CM

## 2025-05-28 PROCEDURE — 97110 THERAPEUTIC EXERCISES: CPT | Mod: GP

## 2025-05-28 NOTE — PROGRESS NOTES
"  Physical Therapy  Physical Therapy Treatment Note    Patient Name: Fifi Larsen \"Carla\"  MRN: [id number]  Today's Date: 5/28/2025  Time Calculation  Start Time: 0746  Stop Time: 0824  Time Calculation (min): 38 min    Insurance:  Visit number: 7 of 60  Authorization info: no auth   Insurance Type: Aetna     General:  Reason for visit: Closed Fx of distal R fibula ORIF   Referred by: Celi Thao       Current Problem  1. Closed fracture of distal end of right fibula with routine healing, unspecified fracture morphology, subsequent encounter  Follow Up In Physical Therapy          Precautions: None      Subjective:   Patient reports she continues to experience some great toe soreness and stiffness.  Feels her ankle mobility has improved grossly    Pain   0/10    Performing HEP?: Yes      Objective:                           (R)                    (L)  Plantarflexion:  58                     72                       Dorsiflexion:     8                      8                        Inversion:           25                    25                                   Eversion:          7                     7                                            Therapeutic Exercise:    49 min  SL tap down  SL tap down and hold 20 sec  SL calf stretch  Kiswahili lunge  Walking lunge with weight shift KB   Lateral lunge with KB   SL RDL  Jogging       Manual Therapy:    5 min  Stretching inv,evr, DF,PF    Neuromuscular Re-education:   min      Gait training:      min      Units  TE-3    Current HEP:  Access Code: [email or web address]  URL: [email or web address]  Date: 05/09/2025  Prepared by: Liliana Moise    Exercises  - Heel Raises with Counter Support  - 1 x daily - 7 x weekly - 3 sets - 10 reps  - Lateral Lunge  - 1 x daily - 7 x weekly - 3 sets - 10 reps  - Standard Lunge  - 1 x daily - 7 x weekly - 3 sets - 10 reps  - Gastroc Stretch on Wall  - 1 x daily - 7 x weekly - 3 sets - 10 reps  - Hip Hiking on Step  - " 1 x daily - 7 x weekly - 3 sets - 10 reps     PT Therapeutic Procedures Time Entry  Therapeutic Exercise Time Entry: 38                      Assessment:   Fifi Larsen has progressed well in physical therapy.  Her ROM is ~95% where it should be and patient notes no pain in her ankle. Only noting pain in her great toe.  She was assessed in her return to jogging program where she noted increased pain initially.  With increased time she demonstrated a more normal gait pattern and decreased great toe pain. Patient agreed to only see her for one additional appointment if she demonstrates complications in her return to jogging program.  Patient will be discharged otherwise.           Plan:  Return to jogging vs discharge       Liliana Moise, PT

## 2025-06-04 ENCOUNTER — APPOINTMENT (OUTPATIENT)
Dept: PHYSICAL THERAPY | Facility: CLINIC | Age: 50
End: 2025-06-04
Payer: COMMERCIAL

## 2025-06-04 ENCOUNTER — DOCUMENTATION (OUTPATIENT)
Dept: PHYSICAL THERAPY | Facility: CLINIC | Age: 50
End: 2025-06-04
Payer: COMMERCIAL

## 2025-06-04 DIAGNOSIS — S82.831D CLOSED FRACTURE OF DISTAL END OF RIGHT FIBULA WITH ROUTINE HEALING, UNSPECIFIED FRACTURE MORPHOLOGY, SUBSEQUENT ENCOUNTER: ICD-10-CM

## 2025-06-04 NOTE — PROGRESS NOTES
Physical Therapy  Discharge Summary    Referral/Discharge Info:  Date of Discharge: 6/4/25  Date of Last Visit: 5/28/25  Date of Evaluation: 4/30/25  Number of Visits Attended: 7  Referred by: Celi Thao   Referred for: Closed Fx of distal R fibula ORIF     Problems/Issues Addressed:    Closed fracture of distal end of right fibula with routine healing, unspecified fracture morphology, subsequent encounter        Decreased range of motion of right ankle       Status at Discharge:  Achieved all goals     Reason for Discharge:  Achieved all goals          Liliana Moise, PT

## 2025-06-18 ENCOUNTER — OFFICE VISIT (OUTPATIENT)
Dept: ORTHOPEDIC SURGERY | Facility: CLINIC | Age: 50
End: 2025-06-18
Payer: COMMERCIAL

## 2025-06-18 ENCOUNTER — HOSPITAL ENCOUNTER (OUTPATIENT)
Dept: RADIOLOGY | Facility: CLINIC | Age: 50
Discharge: HOME | End: 2025-06-18
Payer: COMMERCIAL

## 2025-06-18 DIAGNOSIS — S82.831D CLOSED FRACTURE OF DISTAL END OF RIGHT FIBULA WITH ROUTINE HEALING, UNSPECIFIED FRACTURE MORPHOLOGY, SUBSEQUENT ENCOUNTER: Primary | ICD-10-CM

## 2025-06-18 DIAGNOSIS — S82.831D CLOSED FRACTURE OF DISTAL END OF RIGHT FIBULA WITH ROUTINE HEALING, UNSPECIFIED FRACTURE MORPHOLOGY, SUBSEQUENT ENCOUNTER: ICD-10-CM

## 2025-06-18 PROCEDURE — 73610 X-RAY EXAM OF ANKLE: CPT | Mod: RT

## 2025-06-18 PROCEDURE — 99212 OFFICE O/P EST SF 10 MIN: CPT | Performed by: ORTHOPAEDIC SURGERY

## 2025-06-18 PROCEDURE — 73610 X-RAY EXAM OF ANKLE: CPT | Mod: RIGHT SIDE | Performed by: RADIOLOGY

## 2025-06-18 ASSESSMENT — PAIN - FUNCTIONAL ASSESSMENT: PAIN_FUNCTIONAL_ASSESSMENT: NO/DENIES PAIN

## 2025-06-18 NOTE — PROGRESS NOTES
Fifi Aguirre is now 3 months from her right ankle ORIF.  She obtained x-rays today, but unfortunately had to leave and return to work prior to me seeing her.  I called her after our scheduled visit time, and we discussed that she is doing quite well.  She has been discharged from PT and is on a return to run program.  She has some occasional tightness in the medial ankle, and some occasional achiness, but she is tolerating this well.  She has no concerns today.    We reviewed that her x-rays demonstrate a well-healed fracture, with maintained alignment of the ankle.  She will follow-up with me as needed.    Celi Thao MD

## 2025-07-02 ENCOUNTER — APPOINTMENT (OUTPATIENT)
Dept: ORTHOPEDIC SURGERY | Facility: CLINIC | Age: 50
End: 2025-07-02
Payer: COMMERCIAL

## 2025-10-22 ENCOUNTER — APPOINTMENT (OUTPATIENT)
Dept: ORTHOPEDIC SURGERY | Facility: CLINIC | Age: 50
End: 2025-10-22
Payer: COMMERCIAL

## (undated) DEVICE — SCOPE WARMER, LAPAROSCOPE, BAG ONLY, LF

## (undated) DEVICE — KIT, DISPOSABLES, FOR DX FIBERTAK

## (undated) DEVICE — SUTURE, VICRYL, 0, 27 IN, UR-6, VIOLET

## (undated) DEVICE — DRAPE COVER, C ARM, FLOUROSCAN IMAGING SYS

## (undated) DEVICE — CORD, MONOPOLAR HIGH FREQUENCY, 8MM PLUG, 300CM

## (undated) DEVICE — Device

## (undated) DEVICE — SUTURE, MONOCRYL PLUS, 3-0, SH UD 27IN

## (undated) DEVICE — GLOVE, SURGICAL, PROTEXIS PI BLUE W/NEUTHERA, 7.0, PF, LF

## (undated) DEVICE — ADVANCED MEDICAL DESIGNS C-ARM PACK, CLIP ON C-ARM DRAPE, 20IN X 20IN FOOT SWITCH DRAPE, 36IN X 36IN SNAP KOVER

## (undated) DEVICE — BANDAGE, COFLEX, 6 X 5 YDS, TAN, STERILE, LF

## (undated) DEVICE — CUFF, TOURNIQUET, 30 X 4, DUAL PORT/SNGL BLADDER, DISP, LF

## (undated) DEVICE — GLOVE, SURGICAL, PROTEXIS PI ORTHO, 7.0, PF, LF

## (undated) DEVICE — DRESSING, NON-ADHERENT, OIL EMULSION, CURITY, 3 X 8 IN, STERILE

## (undated) DEVICE — DRAPE, SHEET, U, W/ADHESIVE STRIP, IMPERVIOUS, 60 X 70 IN, DISPOSABLE, LF, STERILE

## (undated) DEVICE — STAPLER, EF POWERED PLUS, CUTTER 340MM, 45MM EFFECTOR, DISP

## (undated) DEVICE — LIGASURE, V SEALER/DIVIDER  5MM BLUNT TIP

## (undated) DEVICE — APPLICATOR, CHLORAPREP, W/ORANGE TINT, 26ML

## (undated) DEVICE — SOLUTION, IRRIGATION, X RX SODIUM CHL 0.9%, 1000ML BTL

## (undated) DEVICE — SUTURE, MONOCRYL, 4-0, 18 IN, PS2, UNDYED

## (undated) DEVICE — GLOVE, PROTEXIS PI CLASSIC, SZ-6.5, PF, LF

## (undated) DEVICE — DRILL BIT, 2.5 MM

## (undated) DEVICE — DRESSING, GAUZE, SPONGE, KERLIX, SUPER, 6 X 6.75 IN, STERILE 10PK

## (undated) DEVICE — TROCAR SYSTEM, BALLOON, KII GELPORT, 12 X 100MM

## (undated) DEVICE — CARE KIT, LAPAROSCOPIC, ADVANCED

## (undated) DEVICE — DRILL BIT, 2.0MM CALIBRATED

## (undated) DEVICE — BANDAGE, ESMARK, 6 IN X 12 FT

## (undated) DEVICE — PUMP, STRYKERFLOW 2 & HANDPIECE W/10FT. IRRIGATION TUBING

## (undated) DEVICE — C-ARMOR C-ARM DRAPE (FORMERLY CONTOUR FABRICATORS, INC. / CFI MEDICAL SOLUTIONS)

## (undated) DEVICE — TOWEL, SURGICAL, NEURO, O/R, 16 X 26, BLUE, STERILE

## (undated) DEVICE — RETRIEVAL SYSTEM, MONARCH, 10MM DISP ENDOSCOPIC

## (undated) DEVICE — TUBE SET, PNEUMOLAR HEATED, SMOKE EVACU, HIGH-FLOW

## (undated) DEVICE — DRILL BIT, 2.7 MM

## (undated) DEVICE — CANNULA, KII ADVANCED FIXATION, 5X100MM W/SEAL

## (undated) DEVICE — STAPLER,  ENDO ECHELON 45MM RELOAD, BLUE

## (undated) DEVICE — DRESSING, ABDOMINAL, TENDERSORB, 8 X 10 IN, STERILE

## (undated) DEVICE — PADDING, UNDERCAST, WEBRIL, 6 IN X 4 YD, REG, NS

## (undated) DEVICE — SYSTEM, TROCAR LAP, 5X100MM, SHIELD BLADED, KII ADVANCED FIX ABDOMINAL

## (undated) DEVICE — PADDING, UNDERCAST, WEBRIL, 4 IN X 4 YD, REG, NS

## (undated) DEVICE — SHEARS, CURVED 5MM, ENDOPATH